# Patient Record
Sex: MALE | Race: WHITE | Employment: OTHER | URBAN - METROPOLITAN AREA
[De-identification: names, ages, dates, MRNs, and addresses within clinical notes are randomized per-mention and may not be internally consistent; named-entity substitution may affect disease eponyms.]

---

## 2017-09-06 ENCOUNTER — TELEPHONE (OUTPATIENT)
Dept: FAMILY MEDICINE CLINIC | Age: 82
End: 2017-09-06

## 2017-09-06 NOTE — TELEPHONE ENCOUNTER
Willian Pereira with [facility] is calling to get orders from Dr. Dutch Pritchard for the patient to complete PT with them. The patient called them with concerns about falls and balance and thought PT would be helpful. Please call Willian Pereira to give her those orders. She is aware that Dr. Dutch Pritchard is out today.

## 2017-12-06 ENCOUNTER — TELEPHONE (OUTPATIENT)
Dept: FAMILY MEDICINE CLINIC | Age: 82
End: 2017-12-06

## 2017-12-06 NOTE — TELEPHONE ENCOUNTER
MAI  --  Patient called this morning upset because someone called him and left a message indicating he needed to make an appointment since he had not been to office in a year. I did not see an encounter. He said he was no longer seeing Dr. Lori Mcgraw and seeing a different PCP. He called back this afternoon upset because the message was still on his phone. I advised he needed to delete it from phone. He told me I needed to delete it and he was hanging up (yelling). SHARYNO call?

## 2018-12-10 ENCOUNTER — APPOINTMENT (OUTPATIENT)
Dept: CT IMAGING | Age: 83
End: 2018-12-10
Payer: MEDICARE

## 2018-12-10 ENCOUNTER — APPOINTMENT (OUTPATIENT)
Dept: GENERAL RADIOLOGY | Age: 83
End: 2018-12-10
Payer: MEDICARE

## 2018-12-10 ENCOUNTER — HOSPITAL ENCOUNTER (EMERGENCY)
Age: 83
Discharge: HOME | End: 2018-12-10
Attending: EMERGENCY MEDICINE
Payer: MEDICARE

## 2018-12-10 VITALS
HEIGHT: 70 IN | TEMPERATURE: 97.8 F | WEIGHT: 170 LBS | RESPIRATION RATE: 20 BRPM | HEART RATE: 82 BPM | SYSTOLIC BLOOD PRESSURE: 148 MMHG | DIASTOLIC BLOOD PRESSURE: 59 MMHG | BODY MASS INDEX: 24.34 KG/M2 | OXYGEN SATURATION: 97 %

## 2018-12-10 DIAGNOSIS — S22.31XA CLOSED FRACTURE OF ONE RIB OF RIGHT SIDE, INITIAL ENCOUNTER: ICD-10-CM
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
## 2018-12-10 PROCEDURE — 72125 CT NECK SPINE W/O DYE: CPT

## 2018-12-10 PROCEDURE — 99285 EMERGENCY DEPT VISIT HI MDM: CPT

## 2018-12-10 PROCEDURE — 73030 X-RAY EXAM OF SHOULDER: CPT

## 2018-12-10 PROCEDURE — 6370000000 HC RX 637 (ALT 250 FOR IP): Performed by: EMERGENCY MEDICINE

## 2018-12-10 PROCEDURE — 70450 CT HEAD/BRAIN W/O DYE: CPT

## 2018-12-10 PROCEDURE — 71101 X-RAY EXAM UNILAT RIBS/CHEST: CPT

## 2018-12-10 RX ORDER — OXYCODONE HYDROCHLORIDE AND ACETAMINOPHEN 5; 325 MG/1; MG/1
1 TABLET ORAL EVERY 8 HOURS PRN
Qty: 6 TABLET | Refills: 0 | Status: SHIPPED | OUTPATIENT
Start: 2018-12-10 | End: 2018-12-17

## 2018-12-10 RX ORDER — IBUPROFEN 600 MG/1
600 TABLET ORAL ONCE
Status: COMPLETED | OUTPATIENT
Start: 2018-12-10 | End: 2018-12-10

## 2018-12-10 RX ADMIN — IBUPROFEN 600 MG: 600 TABLET ORAL at 04:35

## 2018-12-10 ASSESSMENT — PAIN DESCRIPTION - ORIENTATION: ORIENTATION: RIGHT

## 2018-12-10 ASSESSMENT — PAIN DESCRIPTION - PAIN TYPE: TYPE: ACUTE PAIN

## 2018-12-10 ASSESSMENT — ENCOUNTER SYMPTOMS
BACK PAIN: 0
NAUSEA: 0
SHORTNESS OF BREATH: 0
COUGH: 0
VOMITING: 0

## 2018-12-10 ASSESSMENT — PAIN SCALES - GENERAL
PAINLEVEL_OUTOF10: 3
PAINLEVEL_OUTOF10: 1

## 2018-12-10 ASSESSMENT — PAIN DESCRIPTION - LOCATION: LOCATION: RIB CAGE

## 2018-12-10 NOTE — ED PROVIDER NOTES
IMPLANT Left 04/28/2016         CURRENT MEDICATIONS       Discharge Medication List as of 12/10/2018  5:45 AM      CONTINUE these medications which have NOT CHANGED    Details   acetaminophen (AMINOFEN) 325 MG tablet Take 2 tablets by mouth every 6 hours as needed for Pain, Disp-24 tablet, R-0Print      naproxen (NAPROSYN) 500 MG tablet Take 1 tablet by mouth 2 times daily for 5 days, Disp-10 tablet, R-0      lisinopril (PRINIVIL;ZESTRIL) 2.5 MG tablet Take 2.5 mg by mouth daily       sertraline (ZOLOFT) 50 MG tablet Take 50 mg by mouth daily      simvastatin (ZOCOR) 40 MG tablet Take 40 mg by mouth nightly      triamcinolone (KENALOG) 0.1 % ointment Apply topically 2 times daily Apply topically 2 times daily. , Topical, Historical Med      clonazePAM (KLONOPIN) 0.5 MG tablet Take 0.5 mg by mouth 2 times daily as needed      carbidopa-levodopa (SINEMET)  MG per tablet Take 3 tablets by mouth 4 times daily       donepezil (ARICEPT) 5 MG tablet   Take 10 mg by mouth nightly       metformin (GLUCOPHAGE) 500 MG tablet Take 500 mg by mouth 2 times daily (with meals). Aspirin Buf,SuHak-PbChp-FbRah, (BUFFERED ASPIRIN) 325 MG TABS Take  by mouth. omeprazole (PRILOSEC) 20 MG capsule Take 20 mg by mouth daily. ALLERGIES     Patient has no known allergies.     FAMILY HISTORY       Family History   Problem Relation Age of Onset    Kidney Disease Mother     Diabetes Father     Diabetes Sister           SOCIAL HISTORY       Social History     Social History    Marital status:      Spouse name: N/A    Number of children: N/A    Years of education: N/A     Social History Main Topics    Smoking status: Former Smoker     Quit date: 1/1/1966    Smokeless tobacco: Never Used    Alcohol use No    Drug use: No    Sexual activity: Not Asked     Other Topics Concern    None     Social History Narrative    None       SCREENINGS             PHYSICAL EXAM    (up to 7 for level 4, 8 ormore for level 5)     ED Triage Vitals   BP Temp Temp Source Pulse Resp SpO2 Height Weight   12/10/18 0312 12/10/18 0312 12/10/18 0312 12/10/18 0312 12/10/18 0312 12/10/18 0312 12/10/18 0314 12/10/18 0314   (!) 148/98 97.8 °F (36.6 °C) Oral 62 18 100 % 5' 10\" (1.778 m) 170 lb (77.1 kg)       Physical Exam   Constitutional: He is oriented to person, place, and time. He appears well-developed and well-nourished. He is cooperative. Non-toxic appearance. He does not have a sickly appearance. He does not appear ill. No distress. Sitting in bed comfortably, speaking in full sentences, following verbal commands appropriately. Not in acute distress     HENT:   Head: Normocephalic and atraumatic. Right Ear: Tympanic membrane normal.   Left Ear: Tympanic membrane normal.   Mouth/Throat: Oropharynx is clear and moist.   Eyes: Pupils are equal, round, and reactive to light. Conjunctivae and EOM are normal.   Neck: Normal range of motion. Neck supple. Cardiovascular: Normal rate, regular rhythm, normal heart sounds and intact distal pulses. Exam reveals no gallop and no friction rub. No murmur heard. Pulmonary/Chest: Effort normal and breath sounds normal. No respiratory distress. He has no decreased breath sounds. He has no wheezes. He has no rhonchi. He has no rales. Abdominal: Soft. Normal appearance and bowel sounds are normal. He exhibits no distension. There is no tenderness. There is no rebound and no guarding. Musculoskeletal: Normal range of motion. He exhibits no edema, tenderness or deformity. Neurological: He is alert and oriented to person, place, and time. GCS eye subscore is 4. GCS verbal subscore is 5. GCS motor subscore is 6. Skin: Skin is warm and dry. No rash noted.        DIAGNOSTIC RESULTS     EKG: All EKG's are interpreted by the Emergency Department Physicianwho either signs or Co-signs this chart in the absence of a cardiologist.      RADIOLOGY:   Non-plain film images such as CT, Ultrasound

## 2018-12-10 NOTE — ED NOTES
Bed: 12  Expected date:   Expected time:   Means of arrival:   Comments:  Drexel Severe, RN  12/10/18 5779

## 2019-01-21 ENCOUNTER — TELEPHONE (OUTPATIENT)
Dept: FAMILY MEDICINE CLINIC | Age: 84
End: 2019-01-21

## 2019-03-14 ENCOUNTER — HOSPITAL ENCOUNTER (EMERGENCY)
Age: 84
Discharge: ANOTHER HOSPITAL | End: 2019-03-14
Attending: EMERGENCY MEDICINE
Payer: MEDICARE

## 2019-03-14 ENCOUNTER — APPOINTMENT (OUTPATIENT)
Dept: CT IMAGING | Age: 84
End: 2019-03-14
Payer: MEDICARE

## 2019-03-14 VITALS
HEART RATE: 57 BPM | WEIGHT: 160 LBS | BODY MASS INDEX: 23.7 KG/M2 | DIASTOLIC BLOOD PRESSURE: 53 MMHG | OXYGEN SATURATION: 99 % | RESPIRATION RATE: 17 BRPM | TEMPERATURE: 96.7 F | HEIGHT: 69 IN | SYSTOLIC BLOOD PRESSURE: 116 MMHG

## 2019-03-14 DIAGNOSIS — G20 PARKINSON'S DISEASE (HCC): ICD-10-CM

## 2019-03-14 DIAGNOSIS — R53.83 FATIGUE, UNSPECIFIED TYPE: ICD-10-CM

## 2019-03-14 DIAGNOSIS — S06.5XAA SUBDURAL HEMATOMA: Primary | ICD-10-CM

## 2019-03-14 LAB
A/G RATIO: 1.3 (ref 1.1–2.2)
ALBUMIN SERPL-MCNC: 3.5 G/DL (ref 3.4–5)
ALP BLD-CCNC: 115 U/L (ref 40–129)
ALT SERPL-CCNC: <5 U/L (ref 10–40)
ANION GAP SERPL CALCULATED.3IONS-SCNC: 11 MMOL/L (ref 3–16)
AST SERPL-CCNC: 10 U/L (ref 15–37)
BASOPHILS ABSOLUTE: 0.1 K/UL (ref 0–0.2)
BASOPHILS RELATIVE PERCENT: 0.8 %
BILIRUB SERPL-MCNC: <0.2 MG/DL (ref 0–1)
BUN BLDV-MCNC: 22 MG/DL (ref 7–20)
CALCIUM SERPL-MCNC: 9 MG/DL (ref 8.3–10.6)
CHLORIDE BLD-SCNC: 104 MMOL/L (ref 99–110)
CO2: 26 MMOL/L (ref 21–32)
CREAT SERPL-MCNC: 1.1 MG/DL (ref 0.8–1.3)
EOSINOPHILS ABSOLUTE: 0.2 K/UL (ref 0–0.6)
EOSINOPHILS RELATIVE PERCENT: 3.2 %
GFR AFRICAN AMERICAN: >60
GFR NON-AFRICAN AMERICAN: >60
GLOBULIN: 2.7 G/DL
GLUCOSE BLD-MCNC: 168 MG/DL (ref 70–99)
HCT VFR BLD CALC: 37.8 % (ref 40.5–52.5)
HEMOGLOBIN: 12.8 G/DL (ref 13.5–17.5)
LYMPHOCYTES ABSOLUTE: 1.2 K/UL (ref 1–5.1)
LYMPHOCYTES RELATIVE PERCENT: 18.2 %
MCH RBC QN AUTO: 29.6 PG (ref 26–34)
MCHC RBC AUTO-ENTMCNC: 33.8 G/DL (ref 31–36)
MCV RBC AUTO: 87.5 FL (ref 80–100)
MONOCYTES ABSOLUTE: 0.6 K/UL (ref 0–1.3)
MONOCYTES RELATIVE PERCENT: 8.3 %
NEUTROPHILS ABSOLUTE: 4.7 K/UL (ref 1.7–7.7)
NEUTROPHILS RELATIVE PERCENT: 69.5 %
PDW BLD-RTO: 17.1 % (ref 12.4–15.4)
PLATELET # BLD: 147 K/UL (ref 135–450)
PMV BLD AUTO: 8.2 FL (ref 5–10.5)
POTASSIUM SERPL-SCNC: 4.2 MMOL/L (ref 3.5–5.1)
RBC # BLD: 4.32 M/UL (ref 4.2–5.9)
SODIUM BLD-SCNC: 141 MMOL/L (ref 136–145)
TOTAL PROTEIN: 6.2 G/DL (ref 6.4–8.2)
TROPONIN: <0.01 NG/ML
WBC # BLD: 6.7 K/UL (ref 4–11)

## 2019-03-14 PROCEDURE — 84484 ASSAY OF TROPONIN QUANT: CPT

## 2019-03-14 PROCEDURE — 70450 CT HEAD/BRAIN W/O DYE: CPT

## 2019-03-14 PROCEDURE — 85025 COMPLETE CBC W/AUTO DIFF WBC: CPT

## 2019-03-14 PROCEDURE — 80053 COMPREHEN METABOLIC PANEL: CPT

## 2019-03-14 PROCEDURE — 99285 EMERGENCY DEPT VISIT HI MDM: CPT

## 2019-03-14 PROCEDURE — 93005 ELECTROCARDIOGRAM TRACING: CPT | Performed by: EMERGENCY MEDICINE

## 2019-03-14 RX ORDER — QUETIAPINE FUMARATE 50 MG/1
75 TABLET, FILM COATED ORAL NIGHTLY
COMMUNITY

## 2019-03-14 RX ORDER — ZOLPIDEM TARTRATE 10 MG/1
10 TABLET ORAL NIGHTLY PRN
COMMUNITY
End: 2020-01-14 | Stop reason: ALTCHOICE

## 2019-03-15 LAB
EKG ATRIAL RATE: 60 BPM
EKG DIAGNOSIS: NORMAL
EKG P AXIS: 69 DEGREES
EKG P-R INTERVAL: 158 MS
EKG Q-T INTERVAL: 442 MS
EKG QRS DURATION: 92 MS
EKG QTC CALCULATION (BAZETT): 442 MS
EKG R AXIS: 43 DEGREES
EKG T AXIS: 14 DEGREES
EKG VENTRICULAR RATE: 60 BPM

## 2019-03-15 PROCEDURE — 93010 ELECTROCARDIOGRAM REPORT: CPT | Performed by: INTERNAL MEDICINE

## 2020-01-14 ENCOUNTER — HOSPITAL ENCOUNTER (EMERGENCY)
Age: 85
Discharge: ANOTHER HOSPITAL | End: 2020-01-14
Attending: EMERGENCY MEDICINE
Payer: MEDICARE

## 2020-01-14 ENCOUNTER — HOSPITAL ENCOUNTER (OUTPATIENT)
Age: 85
Setting detail: OBSERVATION
Discharge: OTHER INSTITUTION | End: 2020-01-16
Attending: INTERNAL MEDICINE | Admitting: INTERNAL MEDICINE
Payer: MEDICARE

## 2020-01-14 ENCOUNTER — APPOINTMENT (OUTPATIENT)
Dept: CT IMAGING | Age: 85
End: 2020-01-14
Payer: MEDICARE

## 2020-01-14 ENCOUNTER — APPOINTMENT (OUTPATIENT)
Dept: GENERAL RADIOLOGY | Age: 85
End: 2020-01-14
Payer: MEDICARE

## 2020-01-14 VITALS
DIASTOLIC BLOOD PRESSURE: 75 MMHG | HEART RATE: 98 BPM | HEIGHT: 68 IN | SYSTOLIC BLOOD PRESSURE: 110 MMHG | RESPIRATION RATE: 21 BRPM | TEMPERATURE: 97.4 F | BODY MASS INDEX: 22.73 KG/M2 | WEIGHT: 150 LBS | OXYGEN SATURATION: 100 %

## 2020-01-14 PROBLEM — I48.91 NEW ONSET A-FIB (HCC): Status: ACTIVE | Noted: 2020-01-14

## 2020-01-14 LAB
A/G RATIO: 1.2 (ref 1.1–2.2)
ALBUMIN SERPL-MCNC: 3.6 G/DL (ref 3.4–5)
ALP BLD-CCNC: 144 U/L (ref 40–129)
ALT SERPL-CCNC: <5 U/L (ref 10–40)
ANION GAP SERPL CALCULATED.3IONS-SCNC: 12 MMOL/L (ref 3–16)
AST SERPL-CCNC: 18 U/L (ref 15–37)
BACTERIA: ABNORMAL /HPF
BASOPHILS ABSOLUTE: 0.1 K/UL (ref 0–0.2)
BASOPHILS RELATIVE PERCENT: 0.6 %
BILIRUB SERPL-MCNC: <0.2 MG/DL (ref 0–1)
BILIRUBIN URINE: NEGATIVE
BLOOD, URINE: ABNORMAL
BUN BLDV-MCNC: 22 MG/DL (ref 7–20)
CALCIUM SERPL-MCNC: 9.1 MG/DL (ref 8.3–10.6)
CHLORIDE BLD-SCNC: 105 MMOL/L (ref 99–110)
CLARITY: CLEAR
CO2: 25 MMOL/L (ref 21–32)
COLOR: YELLOW
CREAT SERPL-MCNC: 1 MG/DL (ref 0.8–1.3)
EKG ATRIAL RATE: 86 BPM
EKG DIAGNOSIS: NORMAL
EKG Q-T INTERVAL: 408 MS
EKG QRS DURATION: 96 MS
EKG QTC CALCULATION (BAZETT): 452 MS
EKG R AXIS: -41 DEGREES
EKG T AXIS: 60 DEGREES
EKG VENTRICULAR RATE: 74 BPM
EOSINOPHILS ABSOLUTE: 0.3 K/UL (ref 0–0.6)
EOSINOPHILS RELATIVE PERCENT: 3 %
EPITHELIAL CELLS, UA: ABNORMAL /HPF
GFR AFRICAN AMERICAN: >60
GFR NON-AFRICAN AMERICAN: >60
GLOBULIN: 3.1 G/DL
GLUCOSE BLD-MCNC: 129 MG/DL (ref 70–99)
GLUCOSE URINE: 100 MG/DL
HCT VFR BLD CALC: 43.5 % (ref 40.5–52.5)
HEMOGLOBIN: 14.5 G/DL (ref 13.5–17.5)
KETONES, URINE: NEGATIVE MG/DL
LACTIC ACID, SEPSIS: 1.6 MMOL/L (ref 0.4–1.9)
LEUKOCYTE ESTERASE, URINE: ABNORMAL
LYMPHOCYTES ABSOLUTE: 1.2 K/UL (ref 1–5.1)
LYMPHOCYTES RELATIVE PERCENT: 12.2 %
MCH RBC QN AUTO: 30.9 PG (ref 26–34)
MCHC RBC AUTO-ENTMCNC: 33.4 G/DL (ref 31–36)
MCV RBC AUTO: 92.5 FL (ref 80–100)
MICROSCOPIC EXAMINATION: YES
MONOCYTES ABSOLUTE: 0.8 K/UL (ref 0–1.3)
MONOCYTES RELATIVE PERCENT: 7.7 %
NEUTROPHILS ABSOLUTE: 7.5 K/UL (ref 1.7–7.7)
NEUTROPHILS RELATIVE PERCENT: 76.5 %
NITRITE, URINE: NEGATIVE
PDW BLD-RTO: 13.9 % (ref 12.4–15.4)
PH UA: 6 (ref 5–8)
PLATELET # BLD: 178 K/UL (ref 135–450)
PMV BLD AUTO: 9.7 FL (ref 5–10.5)
POTASSIUM REFLEX MAGNESIUM: 4.4 MMOL/L (ref 3.5–5.1)
PROTEIN UA: ABNORMAL MG/DL
RBC # BLD: 4.7 M/UL (ref 4.2–5.9)
RBC UA: ABNORMAL /HPF (ref 0–2)
SODIUM BLD-SCNC: 142 MMOL/L (ref 136–145)
SPECIFIC GRAVITY UA: 1.02 (ref 1–1.03)
TOTAL PROTEIN: 6.7 G/DL (ref 6.4–8.2)
TROPONIN: <0.01 NG/ML
TSH REFLEX: 3.47 UIU/ML (ref 0.27–4.2)
URINE REFLEX TO CULTURE: YES
URINE TYPE: ABNORMAL
UROBILINOGEN, URINE: 0.2 E.U./DL
WBC # BLD: 9.8 K/UL (ref 4–11)
WBC UA: ABNORMAL /HPF (ref 0–5)

## 2020-01-14 PROCEDURE — 70498 CT ANGIOGRAPHY NECK: CPT

## 2020-01-14 PROCEDURE — 85025 COMPLETE CBC W/AUTO DIFF WBC: CPT

## 2020-01-14 PROCEDURE — 87086 URINE CULTURE/COLONY COUNT: CPT

## 2020-01-14 PROCEDURE — 6360000002 HC RX W HCPCS: Performed by: EMERGENCY MEDICINE

## 2020-01-14 PROCEDURE — 84484 ASSAY OF TROPONIN QUANT: CPT

## 2020-01-14 PROCEDURE — 93005 ELECTROCARDIOGRAM TRACING: CPT | Performed by: EMERGENCY MEDICINE

## 2020-01-14 PROCEDURE — 36415 COLL VENOUS BLD VENIPUNCTURE: CPT

## 2020-01-14 PROCEDURE — 71045 X-RAY EXAM CHEST 1 VIEW: CPT

## 2020-01-14 PROCEDURE — 87040 BLOOD CULTURE FOR BACTERIA: CPT

## 2020-01-14 PROCEDURE — 94761 N-INVAS EAR/PLS OXIMETRY MLT: CPT

## 2020-01-14 PROCEDURE — G0379 DIRECT REFER HOSPITAL OBSERV: HCPCS

## 2020-01-14 PROCEDURE — G0378 HOSPITAL OBSERVATION PER HR: HCPCS

## 2020-01-14 PROCEDURE — 80053 COMPREHEN METABOLIC PANEL: CPT

## 2020-01-14 PROCEDURE — 87077 CULTURE AEROBIC IDENTIFY: CPT

## 2020-01-14 PROCEDURE — 70450 CT HEAD/BRAIN W/O DYE: CPT

## 2020-01-14 PROCEDURE — 81001 URINALYSIS AUTO W/SCOPE: CPT

## 2020-01-14 PROCEDURE — 84443 ASSAY THYROID STIM HORMONE: CPT

## 2020-01-14 PROCEDURE — 6360000004 HC RX CONTRAST MEDICATION: Performed by: EMERGENCY MEDICINE

## 2020-01-14 PROCEDURE — 2580000003 HC RX 258: Performed by: EMERGENCY MEDICINE

## 2020-01-14 PROCEDURE — 2700000000 HC OXYGEN THERAPY PER DAY

## 2020-01-14 PROCEDURE — 83605 ASSAY OF LACTIC ACID: CPT

## 2020-01-14 PROCEDURE — 6370000000 HC RX 637 (ALT 250 FOR IP): Performed by: EMERGENCY MEDICINE

## 2020-01-14 PROCEDURE — 93010 ELECTROCARDIOGRAM REPORT: CPT | Performed by: INTERNAL MEDICINE

## 2020-01-14 PROCEDURE — 99285 EMERGENCY DEPT VISIT HI MDM: CPT

## 2020-01-14 PROCEDURE — 87186 SC STD MICRODIL/AGAR DIL: CPT

## 2020-01-14 RX ORDER — CLOBETASOL PROPIONATE 0.05 G/ML
SPRAY TOPICAL
COMMUNITY

## 2020-01-14 RX ORDER — 0.9 % SODIUM CHLORIDE 0.9 %
500 INTRAVENOUS SOLUTION INTRAVENOUS ONCE
Status: COMPLETED | OUTPATIENT
Start: 2020-01-14 | End: 2020-01-14

## 2020-01-14 RX ADMIN — SODIUM CHLORIDE 500 ML: 9 INJECTION, SOLUTION INTRAVENOUS at 16:34

## 2020-01-14 RX ADMIN — IOPAMIDOL 85 ML: 755 INJECTION, SOLUTION INTRAVENOUS at 16:12

## 2020-01-14 RX ADMIN — CARBIDOPA AND LEVODOPA 1 TABLET: 25; 100 TABLET ORAL at 17:37

## 2020-01-14 RX ADMIN — CEFEPIME 2 G: 2 INJECTION, POWDER, FOR SOLUTION INTRAVENOUS at 16:31

## 2020-01-14 ASSESSMENT — PAIN SCALES - GENERAL: PAINLEVEL_OUTOF10: 5

## 2020-01-14 NOTE — ED NOTES
Sent Perfect serve to Dr. Amber Myers to be sent on over to [facility]. Who Dr. Maya Perkins talked to about this pt. When down here in ER.       Malia Ortiz  01/14/20 0743

## 2020-01-14 NOTE — ED NOTES
1740 - ([address]) @ [address] [facility]. Called for [facility] at [facility]. Dx: General Weakness/Hypothermia     Eric Cabral  01/14/20 1745    Transport Needs:  Medic unit  IV - Hep Lock  o2 - 2 Lpm  + Monitor      Eric Cabral  01/14/20 1756    1821 - Dr. Anamaria Myers called back via [address] [facility]. Eric Cabarl  01/14/20 1822    1846 - called [facility] to ask question went ahead and had Mikala QUEZADA take down transport needs for this pt. Eric Cabral  01/14/20 1847    1858 - [facility] called with admit info.   Room # 598  Report # [id number]  [facility] ETA 1 Hour     Eric Cabral  01/14/20 1900

## 2020-01-14 NOTE — ED PROVIDER NOTES
[address] ED    CHIEF COMPLAINT  Facial Droop (left sided facial droop  pt came from [place] where last noticed normal 1230   came by EMS)       HISTORY OF PRESENT ILLNESS  Jovita Herron is a 80 y.o. male with past medical history including DM 2, hypertension, hyperlipidemia, Parkinson's disease, and as below who presents to the ED via EMS from nursing facility with left facial droop and dysarthria. The patient apparently went to use the restroom at the nursing facility and was found to be staring off in space thereafter. Was difficult to arouse. Was noted to have left facial droop and dysarthria. Temp 92.7 F prior to transport. . Last seen well at 1230. Symptoms noted at 1245. BP 96/68 en route. Daughter arrives later in the emergency department course and states that facial droop is normal for the patient. States that weakness of the extremities is new for the patient. Chart review reveals history of subdural hematoma within the last year. Patient himself is without complaint. No other complaints, modifying factors or associated symptoms.      I have reviewed the following from the nursing documentation:    Past Medical History:   Diagnosis Date    GERD (gastroesophageal reflux disease)     Hyperlipidemia     Hypertension     Parkinson disease ([facility] [address]     Type II or unspecified type diabetes mellitus without mention of complication, not stated as uncontrolled     Ulcer      Past Surgical History:   Procedure Laterality Date    CATARACT REMOVAL WITH IMPLANT      CATARACT REMOVAL WITH IMPLANT Left 04/28/2016     Family History   Problem Relation Age of Onset    Kidney Disease Mother     Diabetes Father     Diabetes Sister      Social History     Socioeconomic History    Marital status:      Spouse name: Not on file    Number of children: Not on file    Years of education: Not on file    Highest education level: Not on file   Occupational History    Not on file MCV 92.5 80.0 - 100.0 fL    MCH 30.9 26.0 - 34.0 pg    MCHC 33.4 31.0 - 36.0 g/dL    RDW 13.9 12.4 - 15.4 %    Platelets 605 941 - 641 K/uL    MPV 9.7 5.0 - 10.5 fL    Neutrophils % 76.5 %    Lymphocytes % 12.2 %    Monocytes % 7.7 %    Eosinophils % 3.0 %    Basophils % 0.6 %    Neutrophils Absolute 7.5 1.7 - 7.7 K/uL    Lymphocytes Absolute 1.2 1.0 - 5.1 K/uL    Monocytes Absolute 0.8 0.0 - 1.3 K/uL    Eosinophils Absolute 0.3 0.0 - 0.6 K/uL    Basophils Absolute 0.1 0.0 - 0.2 K/uL   Urinalysis Reflex to Culture   Result Value Ref Range    Color, UA Yellow Straw/Yellow    Clarity, UA Clear Clear    Glucose, Ur 100 (A) Negative mg/dL    Bilirubin Urine Negative Negative    Ketones, Urine Negative Negative mg/dL    Specific Gravity, UA 1.020 1.005 - 1.030    Blood, Urine SMALL (A) Negative    pH, UA 6.0 5.0 - 8.0    Protein, UA TRACE (A) Negative mg/dL    Urobilinogen, Urine 0.2 <2.0 E.U./dL    Nitrite, Urine Negative Negative    Leukocyte Esterase, Urine SMALL (A) Negative    Microscopic Examination YES     Urine Type NotGiven     Urine Reflex to Culture Yes    Lactate, Sepsis   Result Value Ref Range    Lactic Acid, Sepsis 1.6 0.4 - 1.9 mmol/L   Microscopic Urinalysis   Result Value Ref Range    WBC, UA 10-20 (A) 0 - 5 /HPF    RBC, UA 10-20 (A) 0 - 2 /HPF    Epi Cells 0-2 /HPF    Bacteria, UA 4+ (A) /HPF       RADIOLOGY  I have reviewed all radiographic studies for this visit. XR CHEST PORTABLE   Final Result   No acute pulmonary disease. CT Head WO Contrast    (Results Pending)   CTA HEAD NECK W CONTRAST    (Results Pending)        ECG  EKG interpreted by myself.    Rate: normal  Rhythm: Atrial fibrillation  Axis: -41  Intervals: QRS 96 QTc 452  ST Segments: no acute abnormality  T waves: no acute abnormality  Comparison: Compared to 3/14/19, the rhythm is atrial fibrillation from normal sinus rhythm   Impression: Atrial fibrillation with left axis deviation, possible septal infarct seen on or before 3/14/2019       ED COURSE/MDM  Patient seen and evaluated. Old records reviewed. Labs and imaging reviewed and results discussed with patient. 80 y.o. male presented with extremity weakness and is found to have hypothermia, urinary tract infection. On arrival, the patient is hypothermic with temperature 93.9 °F.  Vital signs otherwise reassuring. GCS 15. Initially, the history of dysarthria and facial droop raise concern for acute ischemic stroke. The patient is in a time window for TPA administration, his previous history of intracranial hemorrhage is contraindication. Moreover, the patient's bilateral extremity weakness seems less consistent with acute ischemic stroke, and the patient's hypothermia and infection are more likely the etiology of his weakness. EKG reveals atrial fibrillation without acute ischemic change. CBC reveals no leukocytosis or significant anemia. Urinalysis is concerning for urinary tract infection. Cefepime administered. Lactate within normal limits, will defer 30 cc/kg IV fluid bolus. At this time I am going off service and have signed out care of the patient to my colleague, Dr. Scott Martinez. At the time of signout, the following is pending:    - f/u outstanding labs   - reassess/disposition (anticipate admit to hospital medicine)    All questions were answered and the patient/family expressed understanding and agreement with the plan. During the patient's ED course, the patient was given:  Medications   cefepime (MAXIPIME) 2 g IVPB minibag (has no administration in time range)   0.9 % sodium chloride bolus (has no administration in time range)        CLINICAL IMPRESSION  1. Septicemia ([address]    2. Acute cystitis with hematuria    3. Hypothermia, initial encounter        DISPOSITION   Admit    Condition: stable    Zuleyma Herrera MD    Note: This chart was created using voice recognition dictation software.  Efforts were made by me to ensure accuracy, however some errors may be present due to limitations of this technology and occasionally words are not transcribed correctly.        Zohreh Vargas MD  01/14/20

## 2020-01-15 ENCOUNTER — APPOINTMENT (OUTPATIENT)
Dept: MRI IMAGING | Age: 85
End: 2020-01-15
Attending: INTERNAL MEDICINE
Payer: MEDICARE

## 2020-01-15 PROBLEM — I63.232 ARTERIAL ISCHEMIC STROKE, ICA, LEFT, ACUTE (HCC): Status: ACTIVE | Noted: 2020-01-15

## 2020-01-15 PROBLEM — R29.90 STROKE-LIKE EPISODE: Status: ACTIVE | Noted: 2020-01-15

## 2020-01-15 PROBLEM — I48.0 PAF (PAROXYSMAL ATRIAL FIBRILLATION) (HCC): Status: ACTIVE | Noted: 2020-01-14

## 2020-01-15 LAB
ESTIMATED AVERAGE GLUCOSE: 182.9 MG/DL
GLUCOSE BLD-MCNC: 111 MG/DL (ref 70–99)
GLUCOSE BLD-MCNC: 141 MG/DL (ref 70–99)
GLUCOSE BLD-MCNC: 151 MG/DL (ref 70–99)
GLUCOSE BLD-MCNC: 162 MG/DL (ref 70–99)
GLUCOSE BLD-MCNC: 260 MG/DL (ref 70–99)
HBA1C MFR BLD: 8 %
LV EF: 58 %
LVEF MODALITY: NORMAL
PERFORMED ON: ABNORMAL

## 2020-01-15 PROCEDURE — 2700000000 HC OXYGEN THERAPY PER DAY

## 2020-01-15 PROCEDURE — 94761 N-INVAS EAR/PLS OXIMETRY MLT: CPT

## 2020-01-15 PROCEDURE — 6370000000 HC RX 637 (ALT 250 FOR IP): Performed by: NURSE PRACTITIONER

## 2020-01-15 PROCEDURE — 2580000003 HC RX 258: Performed by: INTERNAL MEDICINE

## 2020-01-15 PROCEDURE — G0378 HOSPITAL OBSERVATION PER HR: HCPCS

## 2020-01-15 PROCEDURE — 99205 OFFICE O/P NEW HI 60 MIN: CPT | Performed by: INTERNAL MEDICINE

## 2020-01-15 PROCEDURE — 83036 HEMOGLOBIN GLYCOSYLATED A1C: CPT

## 2020-01-15 PROCEDURE — C8929 TTE W OR WO FOL WCON,DOPPLER: HCPCS

## 2020-01-15 PROCEDURE — 6370000000 HC RX 637 (ALT 250 FOR IP): Performed by: REGISTERED NURSE

## 2020-01-15 PROCEDURE — 92610 EVALUATE SWALLOWING FUNCTION: CPT

## 2020-01-15 PROCEDURE — 99220 PR INITIAL OBSERVATION CARE/DAY 70 MINUTES: CPT | Performed by: PSYCHIATRY & NEUROLOGY

## 2020-01-15 PROCEDURE — 92526 ORAL FUNCTION THERAPY: CPT

## 2020-01-15 PROCEDURE — 70551 MRI BRAIN STEM W/O DYE: CPT

## 2020-01-15 PROCEDURE — 6370000000 HC RX 637 (ALT 250 FOR IP): Performed by: INTERNAL MEDICINE

## 2020-01-15 PROCEDURE — 6360000002 HC RX W HCPCS: Performed by: INTERNAL MEDICINE

## 2020-01-15 PROCEDURE — 96372 THER/PROPH/DIAG INJ SC/IM: CPT

## 2020-01-15 RX ORDER — DEXTROSE MONOHYDRATE 25 G/50ML
12.5 INJECTION, SOLUTION INTRAVENOUS PRN
Status: DISCONTINUED | OUTPATIENT
Start: 2020-01-15 | End: 2020-01-16 | Stop reason: HOSPADM

## 2020-01-15 RX ORDER — SODIUM CHLORIDE 0.9 % (FLUSH) 0.9 %
10 SYRINGE (ML) INJECTION EVERY 12 HOURS SCHEDULED
Status: DISCONTINUED | OUTPATIENT
Start: 2020-01-15 | End: 2020-01-16 | Stop reason: HOSPADM

## 2020-01-15 RX ORDER — ONDANSETRON 2 MG/ML
4 INJECTION INTRAMUSCULAR; INTRAVENOUS EVERY 6 HOURS PRN
Status: DISCONTINUED | OUTPATIENT
Start: 2020-01-15 | End: 2020-01-16 | Stop reason: HOSPADM

## 2020-01-15 RX ORDER — SODIUM CHLORIDE 0.9 % (FLUSH) 0.9 %
10 SYRINGE (ML) INJECTION PRN
Status: DISCONTINUED | OUTPATIENT
Start: 2020-01-15 | End: 2020-01-16 | Stop reason: HOSPADM

## 2020-01-15 RX ORDER — ACETAMINOPHEN 325 MG/1
650 TABLET ORAL EVERY 4 HOURS PRN
Status: DISCONTINUED | OUTPATIENT
Start: 2020-01-15 | End: 2020-01-16 | Stop reason: HOSPADM

## 2020-01-15 RX ORDER — VITAMIN B COMPLEX
1000 TABLET ORAL DAILY
Status: DISCONTINUED | OUTPATIENT
Start: 2020-01-15 | End: 2020-01-16 | Stop reason: HOSPADM

## 2020-01-15 RX ORDER — DONEPEZIL HYDROCHLORIDE 5 MG/1
10 TABLET, FILM COATED ORAL NIGHTLY
Status: DISCONTINUED | OUTPATIENT
Start: 2020-01-15 | End: 2020-01-16 | Stop reason: HOSPADM

## 2020-01-15 RX ORDER — SERTRALINE HYDROCHLORIDE 100 MG/1
100 TABLET, FILM COATED ORAL DAILY
COMMUNITY

## 2020-01-15 RX ORDER — AMOXICILLIN AND CLAVULANATE POTASSIUM 875; 125 MG/1; MG/1
1 TABLET, FILM COATED ORAL EVERY 12 HOURS SCHEDULED
Status: DISCONTINUED | OUTPATIENT
Start: 2020-01-15 | End: 2020-01-16 | Stop reason: HOSPADM

## 2020-01-15 RX ORDER — DEXTROSE MONOHYDRATE 50 MG/ML
100 INJECTION, SOLUTION INTRAVENOUS PRN
Status: DISCONTINUED | OUTPATIENT
Start: 2020-01-15 | End: 2020-01-16 | Stop reason: HOSPADM

## 2020-01-15 RX ORDER — NICOTINE POLACRILEX 4 MG
15 LOZENGE BUCCAL PRN
Status: DISCONTINUED | OUTPATIENT
Start: 2020-01-15 | End: 2020-01-16 | Stop reason: HOSPADM

## 2020-01-15 RX ORDER — QUETIAPINE FUMARATE 25 MG/1
75 TABLET, FILM COATED ORAL NIGHTLY
Status: DISCONTINUED | OUTPATIENT
Start: 2020-01-15 | End: 2020-01-16 | Stop reason: HOSPADM

## 2020-01-15 RX ORDER — PANTOPRAZOLE SODIUM 40 MG/1
40 TABLET, DELAYED RELEASE ORAL
Status: DISCONTINUED | OUTPATIENT
Start: 2020-01-15 | End: 2020-01-16 | Stop reason: HOSPADM

## 2020-01-15 RX ADMIN — CARBIDOPA AND LEVODOPA 1.5 TABLET: 25; 100 TABLET ORAL at 12:38

## 2020-01-15 RX ADMIN — CARBIDOPA AND LEVODOPA 1.5 TABLET: 25; 100 TABLET ORAL at 17:08

## 2020-01-15 RX ADMIN — QUETIAPINE FUMARATE 75 MG: 25 TABLET ORAL at 21:49

## 2020-01-15 RX ADMIN — INSULIN LISPRO 1 UNITS: 100 INJECTION, SOLUTION INTRAVENOUS; SUBCUTANEOUS at 12:39

## 2020-01-15 RX ADMIN — ACETAMINOPHEN 650 MG: 325 TABLET ORAL at 12:42

## 2020-01-15 RX ADMIN — AMOXICILLIN AND CLAVULANATE POTASSIUM 1 TABLET: 875; 125 TABLET, FILM COATED ORAL at 21:49

## 2020-01-15 RX ADMIN — INSULIN LISPRO 1 UNITS: 100 INJECTION, SOLUTION INTRAVENOUS; SUBCUTANEOUS at 17:09

## 2020-01-15 RX ADMIN — CARBIDOPA AND LEVODOPA 1.5 TABLET: 25; 100 TABLET ORAL at 08:39

## 2020-01-15 RX ADMIN — ENOXAPARIN SODIUM 40 MG: 40 INJECTION SUBCUTANEOUS at 08:39

## 2020-01-15 RX ADMIN — SERTRALINE HYDROCHLORIDE 100 MG: 50 TABLET ORAL at 08:38

## 2020-01-15 RX ADMIN — Medication 10 ML: at 08:40

## 2020-01-15 RX ADMIN — DONEPEZIL HYDROCHLORIDE 10 MG: 5 TABLET, FILM COATED ORAL at 21:49

## 2020-01-15 RX ADMIN — INSULIN LISPRO 2 UNITS: 100 INJECTION, SOLUTION INTRAVENOUS; SUBCUTANEOUS at 21:49

## 2020-01-15 RX ADMIN — Medication 10 ML: at 21:48

## 2020-01-15 RX ADMIN — VITAMIN D, TAB 1000IU (100/BT) 1000 UNITS: 25 TAB at 08:38

## 2020-01-15 RX ADMIN — CARBIDOPA AND LEVODOPA 1.5 TABLET: 25; 100 TABLET ORAL at 21:48

## 2020-01-15 RX ADMIN — PANTOPRAZOLE SODIUM 40 MG: 40 TABLET, DELAYED RELEASE ORAL at 06:55

## 2020-01-15 ASSESSMENT — PAIN SCALES - GENERAL
PAINLEVEL_OUTOF10: 2
PAINLEVEL_OUTOF10: 0
PAINLEVEL_OUTOF10: 6

## 2020-01-15 NOTE — PROGRESS NOTES
Secure message sent to cross cover, \"MAI direct admit arrived from [place]. Can we get orders placed? Thank you. mary ONEILL has DNR-CC paperwork in his chart. \"

## 2020-01-15 NOTE — PROGRESS NOTES
Patients ECHO, MRI and Neuro note are in. Let me know what your thoughts on discharge for this evening so I can update daughter.  Thanks

## 2020-01-15 NOTE — PLAN OF CARE
Problem: Falls - Risk of:  Goal: Will remain free from falls  Description  Will remain free from falls  Outcome: Ongoing     Problem: Risk for Impaired Skin Integrity  Goal: Tissue integrity - skin and mucous membranes  Description  Structural intactness and normal physiological function of skin and  mucous membranes. Outcome: Ongoing     Problem: Serum Glucose Level - Abnormal:  Goal: Ability to maintain appropriate glucose levels will improve  Description  Ability to maintain appropriate glucose levels will improve  Outcome: Ongoing   Pt educated on the importance of a carb control diet. Monitoring pt's blood glucose AC/HS. Sliding scale insulin given as ordered according to pt's blood glucose. Will continue to monitor.

## 2020-01-15 NOTE — ED PROVIDER NOTES
1:12 AM: I discussed the history, physical examination, laboratory and imaging studies, and treatment plan with Dr. Vinod Sebastian. Jovita Herron was signed out to me in stable condition. Please see Dr. Jeny Buck documentation for details of their history, physical, and laboratory studies. Upon re-examination, a summary of Adriana Bartlett's history, physical examination, and studies are as follows:      Patient was signed out to me pending head CT and lab studies. Patient's EKG is found to be atrial fibrillation which is new from prior. He was hypothermic here and the atrial fibrillation could be related to his hypothermia. However, he has not had this issue in the past.  At this point I will hold off on anticoagulation as he has a healing subdural hematoma. There are no ischemic changes seen on EKG. His troponin is negative. He is hypothermic with a normal lactate. This is concerning for infection and UA does appear infected with 4+ bacteria and pyuria. This sent for culture and he is started on cefepime. There is no ARELIS. He is mildly dehydrated and he is given fluids. CT head is negative for anything acute, just shows the improvement in the prior subdural.  CTA shows a potential aneurysm although I do not feel this is related to his symptoms today. The patient remained having a normal mental status here. TSH is within normal limits. I did speak with the hospitalist team here who prefers that the patient be transferred since he has new onset A. fib with prior intracranial hemorrhage, they feel he needs a neuro evaluation. They also suggested this but because he has generalized weakness. Again, I do not appreciate any focal deficits on examination however hospitalist team preferred that he be transferred.   The patient's daughter does state that the facial droop is chronic, and this is more so due to the fact that he had prior surgery on his left lower lip which makes him have the appearance of

## 2020-01-15 NOTE — PROGRESS NOTES
Physical Therapy/  Occupational Therapy  Orders received, chart reviewed, attempted PT/OT evaluations, pt being picked up for MRI on approach. Will attempt at later date/time [place] [id number]  Dee Galindo, OTR/L

## 2020-01-15 NOTE — PROGRESS NOTES
Speech Language Pathology  Facility/Department: [facility] A2 CARD TELEMETRY   CLINICAL BEDSIDE SWALLOW EVALUATION    NAME: Fer Almazan  : 1933  MRN: [id number]     SLP recommends continue a regular diet with thin liquids, meds whole with water or in bite of puree as tolerated, straws OK, aspiration precautions. SLP to follow 1-2x/week to monitor diet tolerance. ADMISSION DATE: 2020  ADMITTING DIAGNOSIS: has Type 2 diabetes mellitus without complication, without long-term current use of insulin ([facility] [address] Essential hypertension; Pure hypercholesterolemia; Spinal stenosis of lumbar region; Parkinsons disease ([facility] [address] Aortic stenosis; Memory loss; Right arm pain; Generalized edema; New onset a-fib [facility]); and Stroke-like episode ([facility] [address] on their problem list.  ONSET DATE: 20    Recent Chest Xray/CT of Chest: 20  Impression   No acute pulmonary disease. Date of Eval: 1/15/2020  Evaluating Therapist: Carola Shearer    Current Diet level:  Current Diet : Regular  Current Liquid Diet : Thin    Primary Complaint  Patient Complaint: None     Pain:  Pain Assessment  Pain Assessment: No report of pain     Reason for Referral  Fer Almazan was referred for a bedside swallow evaluation to assess the efficiency of his swallow function, identify signs and symptoms of aspiration and make recommendations regarding safe dietary consistencies, effective compensatory strategies, and safe eating environment. Impression  Dysphagia Diagnosis: Mild oral stage dysphagia;Mild pharyngeal stage dysphagia  Dysphagia Outcome Severity Scale: Level 6: Within functional limits/Modified independence     Dysphagia Impression : Pt seen this date for BSE. RN ok'd entry of SLP. Pt was awake and alert, partially reclined in bed. Pt's wife at bedside and initially resistant to SLP evaluation stating, \"He has Parkinson's. Who ordered this? He needs help with eating. \" Once SLP explained role and reasoning for

## 2020-01-15 NOTE — PROGRESS NOTES
Secure message sent to Sheila Campos NP, \"Pt direct admit from [place]. Hx of DM. Can we get SSI ordered? EKG at [place] showed new onset a-fib. Can we get tele ordered? Pt has DNR-CC paperwork in his chart. Can we change his code status? Thank you!!\"    Awaiting response.

## 2020-01-15 NOTE — PROGRESS NOTES
4 Eyes Skin Assessment     The patient is being assess for  Admission    I agree that 2 RN's have performed a thorough Head to Toe Skin Assessment on the patient. ALL assessment sites listed below have been assessed. Areas assessed by both nurses: Estela Conley and Whit RN  [x]   Head, Face, and Ears   [x]   Shoulders, Back, and Chest  [x]   Arms, Elbows, and Hands   [x]   Coccyx, Sacrum, and IschIum  [x]   Legs, Feet, and Heels     Abrasions, bruising scattered  Redness in groin folds  Redness bilat heels  Does the Patient have Skin Breakdown?   No         Logan Prevention initiated:  Yes   Wound Care Orders initiated:  No      [facility] nurse consulted for Pressure Injury (Stage 3,4, Unstageable, DTI, NWPT, and Complex wounds), New and Established Ostomies:  No      Nurse 1 eSignature: Electronically signed by Randy Rendon RN on 1/15/20 at 2:05 AM    **SHARE this note so that the co-signing nurse is able to place an eSignature**    Nurse 2 eSignature: Electronically signed by Garcia Reza RN on 1/15/20 at 3:54 PM

## 2020-01-15 NOTE — CARE COORDINATION
CASE MANAGEMENT INITIAL ASSESSMENT      Reviewed chart and met with patient today, re: assessed for DCP needs  Explained Case Management role/services Pt wife Soumya Holly. Family present: Pt wife Soumya Holly   Primary contact information: Pt wife Rockford Halsted date/status: observation  Diagnosis: new onset Afib  Is this a Readmission?:no    Insurance: [employer] Medicare  Precert required for SNF - Y        3 night stay required - N    Living arrangements, Adls, care needs, prior to admission: Pt lives in [address] [place] [address] at [facility] AUTHORITY: Transport back to facility no preference from family    [facility] at home: Walker__Cane__RTS__ BSC__Shower Chair__  02__ HHN__ CPAP__  BiPap__  Hospital Bed__ W/C___ Other__________    Services in the home and/or outpatient, prior to admission: none    Dialysis Facility (if applicable) none  · Name:  · Address:  · Dialysis Schedule:  · Phone:  · Fax:    PT/OT recs: none noted    Hospital Exemption Notification (HEN): if SNF is needed    Barriers to discharge: none    Plan/comments: Soumya Holly, Pt wife reported that she is waiting to get the Pt transferred back to LTC. She does not have a preference on transport. SW attempted to call [facility] home and left a VM message for Lay Levi will follow to arrange DCP.   FALLON Land      ECOC on chart for MD signature

## 2020-01-15 NOTE — CONSULTS
dextrose 50 % IV solution  12.5 g Intravenous PRN ENRIQUE Rose - CNP        glucagon (rDNA) injection 1 mg  1 mg Intramuscular PRN ENRIQUE Rose - CNP        dextrose 5 % solution  100 mL/hr Intravenous PRN ENRIQUE Rose - CNP        amoxicillin-clavulanate (AUGMENTIN) 875-125 MG per tablet 1 tablet  1 tablet Oral 2 times per day Kendal OlmosENRIQUE long CNP           ROS : A 10-14 system review was limited from the patient otherwise per H&P. Exam:     Constitutional:   Vitals:    01/15/20 0445 01/15/20 0744 01/15/20 1233 01/15/20 1602   BP: (!) 146/74 (!) 147/75 130/75 (!) 146/80   Pulse: 71 67 64 76   Resp: 18 16 16 16   Temp: 97.3 °F (36.3 °C) 97.6 °F (36.4 °C) 97.8 °F (36.6 °C) 97.8 °F (36.6 °C)   TempSrc: Oral Oral Oral Oral   SpO2: 100% 100% 99% 98%   Weight:       Height:           General appearance:  Normal development and appear in no acute distress. Eye: No icterus. Fundus: Cannot test funduscopic exam due to poor cooperation from the patient. Neck: supple  Cardiovascular: No carotid bruit. No lower leg edema with good pulsation. Mental Status:   AAO x2  1/3 immediate recall  Fluent language but with paraphasic errors  Poor immediate recall and remote memory  Poor fund of knowledge  Cranial Nerves:   II: Visual fields: Full to confrontation and nl VA. Pupils: equal, round, reactive to light  III,IV,VI: Extra Ocular Movements are intact. No nystagmus  V: Facial sensation is intact to pin prick and light touch  VII: Facial strength and movements: intact and symmetric  VIII: Hearing: Intact to finger rub bilaterally  IX: Palate elevation is symmetric  XI: Shoulder shrug is intact  XII: Tongue movements are normal  Musculoskeletal: 5/5 in the left side and right-sided weakness 3+/5 with poor effort. All 4 extremities. Tone: Normal tone. Reflexes: Bilateral biceps 2/4, triceps 2/4, brachial radialis 2/4, knee 2/4 and ankle 2/4.    Planters: flexor

## 2020-01-15 NOTE — CONSULTS
Electrophysiology Consultation   Date: 1/15/2020  Admit Date:  1/14/2020  Reason for Consultation: Atrial fibrillation and cerebral vascular disease  Consult Requesting Physician: Kasie Paulino MD     No chief complaint on file. HPI:   Mr. Riana Gay is a pleasant 80year old  with a medical history significant for Parkinson's disease, right sided subdural hematoma (CT scan from 03/2019), dementia, aortic valve stenosis, diabetes mellitus type II, recurrent falls, hypertension, hyperlipidemia, and coronary artery disease who presents from home with TIA. According to patient and his daughter he was in his usual state of health until yesterday. States that he was being evaluated by his provider at his nursing home and had stepped away to use rest room when he became limp and couldn't move. States that his mental status worsened and while his vital signs were stable, his provider was concerned for stroke. He was sent [facility] for further evaluation for stroke. ER/Hospital Course:  Patient presented with stroke like symptoms and was admitted with concern for stroke. His EKG was concerning for atrial fibrillation. Rates were controlled. Labs were reassuring. CT scan showed no acute infarct or hemorrhage and previous hematoma has resolved. Electrophysiology was consulted to assist with further management of his newly diagnosed atrial fibrillation.     Current rhythm: Sinus rhythm  Known history of atrial fibrillation: no  Valvular disease: Yes, atrial fibrillation  Associated symptoms: none  Heart rate is  controlled  Previous cardioversion and/or ablation: no  History of CAD: No  History of sleep apnea: unknown  History of ETOH abuse/drug abuse: No  History of thyroid disease: No  Left atrial size is pending echo    Past Medical History:   Diagnosis Date    GERD (gastroesophageal reflux disease)     Hyperlipidemia     Hypertension     Parkinson disease ([facility] [address]     Type II or %    · Telemetry: Sinus rhythm   · Constitutional: Alert. Oriented to person, place, and time. No distress. · Head: Normocephalic and atraumatic. · Mouth/Throat: Lips appear moist. Oropharynx is clear and moist.  · Eyes: Conjunctivae normal. EOM are normal.   · Neck: Neck supple. No lymphadenopathy. No rigidity. No JVD present. · Cardiovascular: Normal rate, regular rhythm. Normal S1&S2. Grade V/VI systolic murmur best LUSB. No diastolic component. · Pulmonary/Chest: Bilateral respiratory sounds present. No respiratory accessory muscle use. · Abdominal: Soft. Normal bowel sounds present. No distension, No tenderness. No splenomegaly. No hernia. · Musculoskeletal: No tenderness. Trace bilateral edema    · Neurological: Alert and oriented. Cranial nerve II-XII grossly intact  · Skin: Skin is warm and dry. No rash, lesions, ulcerations noted. · Psychiatric: No anxiety nor agitation. Labs:  Reviewed. Recent Labs     01/14/20  1525      K 4.4      CO2 25   BUN 22*   CREATININE 1.0     Recent Labs     01/14/20  1420   WBC 9.8   HGB 14.5   HCT 43.5   MCV 92.5        Lab Results   Component Value Date    TROPONINI <0.01 01/14/2020     No results found for: BNP  No results found for: PROTIME, INR  No results found for: CHOL, HDL, TRIG    Diagnostic and imaging results reviewed. ECG: Atrial fibrillation without signs of ongoing ischemia. Echo: Pending. Cath: None. I independently reviewed the ECG and telemetry.     Scheduled Meds:   sodium chloride flush  10 mL Intravenous 2 times per day    enoxaparin  40 mg Subcutaneous Daily    sertraline  100 mg Oral Daily    carbidopa-levodopa  1.5 tablet Oral 4x Daily    QUEtiapine  75 mg Oral Nightly    donepezil  10 mg Oral Nightly    pantoprazole  40 mg Oral QAM AC    Vitamin D  1,000 Units Oral Daily    insulin lispro  0-6 Units Subcutaneous TID WC    insulin lispro  0-3 Units Subcutaneous Nightly    amoxicillin-clavulanate  1 tablet Oral 2 times per day     Continuous Infusions:   dextrose       PRN Meds:.sodium chloride flush, magnesium hydroxide, ondansetron, acetaminophen, glucose, dextrose, glucagon (rDNA), dextrose     Assessment:   Patient Active Problem List    Diagnosis Date Noted    Stroke-like episode ([facility] [address] 01/15/2020    New onset a-fib ([address] 01/14/2020    Right arm pain 10/05/2016    Generalized edema 10/05/2016    Memory loss 06/05/2015    Aortic stenosis 07/29/2014    Parkinsons disease ([address] 06/21/2013    Type 2 diabetes mellitus without complication, without long-term current use of insulin ([address] 05/13/2013    Essential hypertension 05/13/2013    Pure hypercholesterolemia 05/13/2013    Spinal stenosis of lumbar region 05/13/2013      Active Hospital Problems    Diagnosis Date Noted    Stroke-like episode ([address] [I63.9] 01/15/2020    New onset a-fib ([address] [I48.91] 01/14/2020    Memory loss [R41.3] 06/05/2015    Parkinsons disease ([address] Alonsokoby Reeves 06/21/2013    Essential hypertension [I10] 05/13/2013    Type 2 diabetes mellitus without complication, without long-term current use of insulin ([address] [E11.9] 05/13/2013     Mr. Dalila Sprague is a pleasant 80year old  with a medical history significant for Parkinson's disease, right sided subdural hematoma (CT scan from 03/2019), dementia, aortic valve stenosis, diabetes mellitus type II, recurrent falls, hypertension, hyperlipidemia, and coronary artery disease who presents from home with TIA. Problem List:  1. Atrial fibrillation. 2. TIA/Cerebral vascular disease. 3. Dementia. 4. Aortic valve stenosis. Assessment and Plan:  1. Atrial fibrillation.   Mr. Dalila Sprague is a pleasant demented  who presents from home with TIA and newly diagnosed atrial fibrillation without RVR and a medical history significant for diabetes, hypertension, subdural hematoma, frequent falls (resolved, at current living facility family states that patient is very safe and hasn't had any further falls), and hyperlipidemia. Patient's KBCIT0TORe score is at least 6 with a stroke risk of 9.7%. Patients HASBLED score is 5. We discussed oral anticoagulation to decrease the risk of thromboembolic events including stroke. Benefits and alternatives were discussed with patient. Risk of bleeding was discussed. Patient verbalized understanding. Different forms of anticoagulants including warfarin (Coumadin), Dabigatran (Pradaxa), Rivaroxaban (Xarelto), Apixaban (Eliquis), and Edoxaban [facility]) were discussed. Patient's daughter opted for anticoagulation therapy given that he is in a safe living environment, at high stroke risk, and no longer has recurrent falls. We discussed [facility] patient family not currently interested in invasive measures. Rate control strategy options including cardioversion, atrial fibrillation ablation, pacemaker with AVN ablation, anti-arrhythmic strategy, and rate control strategy were discussed with patient. Risks, benefits and alternative of each treatment options were explained. All questions were answered. Patient's daughter Anayeli Buckley has opted for rate control. His rates have never been fast so will hold oral therapy for now. - Once evaluated by neurology and post MRI we will plan to start patient on rivaroxaban. - Hold on rate control agents as has never been tachycardic.   - We will continue to follow along with you. 2. TIA/Cerebral vascular disease.  - Per neurology and primary team.    3. Dementia. - Per neurology and primary team.    4. Aortic valve stenosis. Echocardiogram pending.  - Per primary team.    Thank you for allowing me to participate in the care of leela De La Torre . If you have any questions/comments, please do not hesitate to contact us.     Kirsten Jung MD  Cardiac Electrophysiology  [address] [facility]  [phone] [facility]

## 2020-01-15 NOTE — H&P
1,000 Units by mouth     Historical Provider, MD   Nutritional Supplements (BALANCED NUTRITIONAL SHAKE PLS PO) Take by mouth    Historical Provider, MD   Clobetasol Propionate 0.05 % LIQD Apply topically To scalp 2 times a day    Historical Provider, MD   QUEtiapine (SEROQUEL) 50 MG tablet Take 75 mg by mouth nightly Take 1.5 tabs by mouth at bedtime (75 mg)    Historical Provider, MD   carbidopa-levodopa (SINEMET)  MG per tablet Take 1.5 tablets by mouth 4 times daily 1.5 tabs 4 times a day    Historical Provider, MD   donepezil (ARICEPT) 10 MG tablet Take 10 mg by mouth nightly     Historical Provider, MD   metFORMIN (GLUCOPHAGE) 850 MG tablet Take 850 mg by mouth daily     Historical Provider, MD   omeprazole (PRILOSEC) 20 MG capsule Take 20 mg by mouth daily. Historical Provider, MD       Allergies:  Patient has no known allergies. Social History:      The patient currently lives at nursing home     TOBACCO:   reports that he quit smoking about 54 years ago. He has never used smokeless tobacco.  ETOH:   reports no history of alcohol use. E-Cigarettes Vaping or Juuling     Questions Responses    E-Cigarette Use     Start Date     Does device contain nicotine? Quit Date     E-Cigarette Type             Family History:      Reviewed in detail and negative for CAD, Cancer, CVA. Positive as follows:        Problem Relation Age of Onset    Kidney Disease Mother     Diabetes Father     Diabetes Sister        REVIEW OF SYSTEMS:   Pertinent positives as noted in the HPI. All other systems reviewed and negative. PHYSICAL EXAM PERFORMED:    BP (!) 146/74   Pulse 71   Temp 97.3 °F (36.3 °C) (Oral)   Resp 18   Ht 5' 8\" (1.727 m)   Wt 169 lb 3.2 oz (76.7 kg)   SpO2 100%   BMI 25.73 kg/m²     General appearance:  No apparent distress, appears stated age and cooperative. HEENT:  Normal cephalic, atraumatic without obvious deformity. Pupils equal, round, and reactive to light.   Extra ocular muscles

## 2020-01-16 VITALS
RESPIRATION RATE: 16 BRPM | HEART RATE: 52 BPM | DIASTOLIC BLOOD PRESSURE: 73 MMHG | WEIGHT: 169.2 LBS | HEIGHT: 68 IN | SYSTOLIC BLOOD PRESSURE: 154 MMHG | BODY MASS INDEX: 25.64 KG/M2 | OXYGEN SATURATION: 99 % | TEMPERATURE: 92.4 F

## 2020-01-16 LAB
ANION GAP SERPL CALCULATED.3IONS-SCNC: 12 MMOL/L (ref 3–16)
BUN BLDV-MCNC: 18 MG/DL (ref 7–20)
CALCIUM SERPL-MCNC: 9.3 MG/DL (ref 8.3–10.6)
CHLORIDE BLD-SCNC: 103 MMOL/L (ref 99–110)
CHOLESTEROL, TOTAL: 214 MG/DL (ref 0–199)
CO2: 25 MMOL/L (ref 21–32)
CREAT SERPL-MCNC: 0.9 MG/DL (ref 0.8–1.3)
GFR AFRICAN AMERICAN: >60
GFR NON-AFRICAN AMERICAN: >60
GLUCOSE BLD-MCNC: 110 MG/DL (ref 70–99)
GLUCOSE BLD-MCNC: 137 MG/DL (ref 70–99)
GLUCOSE BLD-MCNC: 202 MG/DL (ref 70–99)
HCT VFR BLD CALC: 40 % (ref 40.5–52.5)
HDLC SERPL-MCNC: 40 MG/DL (ref 40–60)
HEMOGLOBIN: 13.8 G/DL (ref 13.5–17.5)
LDL CHOLESTEROL CALCULATED: 136 MG/DL
MAGNESIUM: 1.7 MG/DL (ref 1.8–2.4)
MCH RBC QN AUTO: 31.3 PG (ref 26–34)
MCHC RBC AUTO-ENTMCNC: 34.5 G/DL (ref 31–36)
MCV RBC AUTO: 90.8 FL (ref 80–100)
ORGANISM: ABNORMAL
PDW BLD-RTO: 14.2 % (ref 12.4–15.4)
PERFORMED ON: ABNORMAL
PERFORMED ON: ABNORMAL
PLATELET # BLD: 149 K/UL (ref 135–450)
PMV BLD AUTO: 8.9 FL (ref 5–10.5)
POTASSIUM REFLEX MAGNESIUM: 4.2 MMOL/L (ref 3.5–5.1)
RBC # BLD: 4.4 M/UL (ref 4.2–5.9)
SODIUM BLD-SCNC: 140 MMOL/L (ref 136–145)
TRIGL SERPL-MCNC: 188 MG/DL (ref 0–150)
URINE CULTURE, ROUTINE: ABNORMAL
VLDLC SERPL CALC-MCNC: 38 MG/DL
WBC # BLD: 5.1 K/UL (ref 4–11)

## 2020-01-16 PROCEDURE — 80061 LIPID PANEL: CPT

## 2020-01-16 PROCEDURE — 97162 PT EVAL MOD COMPLEX 30 MIN: CPT

## 2020-01-16 PROCEDURE — 97535 SELF CARE MNGMENT TRAINING: CPT

## 2020-01-16 PROCEDURE — 97116 GAIT TRAINING THERAPY: CPT

## 2020-01-16 PROCEDURE — 36415 COLL VENOUS BLD VENIPUNCTURE: CPT

## 2020-01-16 PROCEDURE — 97530 THERAPEUTIC ACTIVITIES: CPT

## 2020-01-16 PROCEDURE — G0378 HOSPITAL OBSERVATION PER HR: HCPCS

## 2020-01-16 PROCEDURE — 97167 OT EVAL HIGH COMPLEX 60 MIN: CPT

## 2020-01-16 PROCEDURE — 2580000003 HC RX 258: Performed by: INTERNAL MEDICINE

## 2020-01-16 PROCEDURE — 6370000000 HC RX 637 (ALT 250 FOR IP): Performed by: REGISTERED NURSE

## 2020-01-16 PROCEDURE — 85027 COMPLETE CBC AUTOMATED: CPT

## 2020-01-16 PROCEDURE — 83735 ASSAY OF MAGNESIUM: CPT

## 2020-01-16 PROCEDURE — 80048 BASIC METABOLIC PNL TOTAL CA: CPT

## 2020-01-16 PROCEDURE — 6370000000 HC RX 637 (ALT 250 FOR IP): Performed by: NURSE PRACTITIONER

## 2020-01-16 PROCEDURE — 6370000000 HC RX 637 (ALT 250 FOR IP): Performed by: INTERNAL MEDICINE

## 2020-01-16 PROCEDURE — 99226 PR SBSQ OBSERVATION CARE/DAY 35 MINUTES: CPT | Performed by: PSYCHIATRY & NEUROLOGY

## 2020-01-16 PROCEDURE — 99215 OFFICE O/P EST HI 40 MIN: CPT | Performed by: INTERNAL MEDICINE

## 2020-01-16 PROCEDURE — 6360000002 HC RX W HCPCS: Performed by: INTERNAL MEDICINE

## 2020-01-16 PROCEDURE — 96372 THER/PROPH/DIAG INJ SC/IM: CPT

## 2020-01-16 RX ORDER — AMOXICILLIN AND CLAVULANATE POTASSIUM 875; 125 MG/1; MG/1
1 TABLET, FILM COATED ORAL EVERY 12 HOURS SCHEDULED
Qty: 20 TABLET | Refills: 0 | Status: SHIPPED | OUTPATIENT
Start: 2020-01-16 | End: 2020-01-26

## 2020-01-16 RX ORDER — ASPIRIN 81 MG/1
81 TABLET ORAL DAILY
Qty: 90 TABLET | Refills: 1 | Status: SHIPPED | OUTPATIENT
Start: 2020-01-16

## 2020-01-16 RX ORDER — ATORVASTATIN CALCIUM 20 MG/1
20 TABLET, FILM COATED ORAL DAILY
Qty: 90 TABLET | Refills: 1 | Status: SHIPPED | OUTPATIENT
Start: 2020-01-16

## 2020-01-16 RX ADMIN — CARBIDOPA AND LEVODOPA 1.5 TABLET: 25; 100 TABLET ORAL at 08:20

## 2020-01-16 RX ADMIN — SERTRALINE HYDROCHLORIDE 100 MG: 50 TABLET ORAL at 08:20

## 2020-01-16 RX ADMIN — ENOXAPARIN SODIUM 40 MG: 40 INJECTION SUBCUTANEOUS at 08:22

## 2020-01-16 RX ADMIN — Medication 10 ML: at 08:22

## 2020-01-16 RX ADMIN — INSULIN LISPRO 2 UNITS: 100 INJECTION, SOLUTION INTRAVENOUS; SUBCUTANEOUS at 13:32

## 2020-01-16 RX ADMIN — AMOXICILLIN AND CLAVULANATE POTASSIUM 1 TABLET: 875; 125 TABLET, FILM COATED ORAL at 08:20

## 2020-01-16 RX ADMIN — VITAMIN D, TAB 1000IU (100/BT) 1000 UNITS: 25 TAB at 08:22

## 2020-01-16 RX ADMIN — PANTOPRAZOLE SODIUM 40 MG: 40 TABLET, DELAYED RELEASE ORAL at 06:50

## 2020-01-16 RX ADMIN — CARBIDOPA AND LEVODOPA 1.5 TABLET: 25; 100 TABLET ORAL at 13:04

## 2020-01-16 ASSESSMENT — PAIN SCALES - GENERAL
PAINLEVEL_OUTOF10: 0

## 2020-01-16 NOTE — PROGRESS NOTES
[id number]  Neurology Follow-up  [facility] Neurology    Date of Service: 1/16/2020    Subjective:   CC: Follow up today regarding: Acute encephalopathy and possible new stroke. Events noted. Chart and lab reviewed. The patient is about the same. He is currently by himself. MRI of the brain showed no acute stroke. He denies any headache, double vision or blurred vision. He is poor historian. According to cardiology, he has no prior history of GI or frequent falling since he moved to a [facility]. Patient denies any other new symptoms. Other review of system was unremarkable. ROS : A 10-12 system review obtained and updated today and is unremarkable except as mentioned  in my interval history. family history includes Diabetes in his father and sister; Kidney Disease in his mother.     Past Medical History:   Diagnosis Date    GERD (gastroesophageal reflux disease)     Hyperlipidemia     Hypertension     Parkinson disease ([facility] [address]     Type II or unspecified type diabetes mellitus without mention of complication, not stated as uncontrolled     Ulcer      Current Facility-Administered Medications   Medication Dose Route Frequency Provider Last Rate Last Dose    magnesium oxide (MAG-OX) tablet 400 mg  400 mg Oral Once ENRIQUE Garcia - CNP        sodium chloride flush 0.9 % injection 10 mL  10 mL Intravenous 2 times per day Bladimir Molina MD   10 mL at 01/16/20 0822    sodium chloride flush 0.9 % injection 10 mL  10 mL Intravenous PRN Bladimir Molina MD        magnesium hydroxide (MILK OF MAGNESIA) 400 MG/5ML suspension 30 mL  30 mL Oral Daily PRN Bladimir Molina MD        ondansetron (ZOFRAN) injection 4 mg  4 mg Intravenous Q6H PRN Bladimir Molina MD        enoxaparin (LOVENOX) injection 40 mg  40 mg Subcutaneous Daily Bladimir Molina MD   40 mg at 01/16/20 6481    acetaminophen (TYLENOL) tablet 650 mg  650 mg Oral Q4H PRN Bladimir Molina MD   650 mg at 01/15/20 7112    sertraline (ZOLOFT) tablet 100 mg  100 mg Oral Daily Bladimir Molina MD   100 mg at 01/16/20 0820    carbidopa-levodopa (SINEMET)  MG per tablet 1.5 tablet  1.5 tablet Oral 4x Daily Bladimir Molina MD   1.5 tablet at 01/16/20 1304    QUEtiapine (SEROQUEL) tablet 75 mg  75 mg Oral Nightly Bladimir Molina MD   75 mg at 01/15/20 2149    donepezil (ARICEPT) tablet 10 mg  10 mg Oral Nightly Bladimir Molina MD   10 mg at 01/15/20 2149    pantoprazole (PROTONIX) tablet 40 mg  40 mg Oral QAM AC Bladimir Molina MD   40 mg at 01/16/20 0650    Vitamin D (CHOLECALCIFEROL) tablet 1,000 Units  1,000 Units Oral Daily Bladimir Molina MD   1,000 Units at 01/16/20 6151    insulin lispro (HUMALOG) injection vial 0-6 Units  0-6 Units Subcutaneous TID WC ENRIQUE Munoz CNP   2 Units at 01/16/20 1332    insulin lispro (HUMALOG) injection vial 0-3 Units  0-3 Units Subcutaneous Nightly ENRIQUE Munoz CNP   2 Units at 01/15/20 2149    glucose (GLUTOSE) 40 % oral gel 15 g  15 g Oral PRN ENRIQUE Munoz CNP        dextrose 50 % IV solution  12.5 g Intravenous PRN ENRIQUE Munoz CNP        glucagon (rDNA) injection 1 mg  1 mg Intramuscular PRN ENRIQUE Munoz CNP        dextrose 5 % solution  100 mL/hr Intravenous PRN ENRIQUE Munoz CNP        amoxicillin-clavulanate (AUGMENTIN) 875-125 MG per tablet 1 tablet  1 tablet Oral 2 times per day ENRQIUE Garcia CNP   1 tablet at 01/16/20 0820     No Known Allergies   reports that he quit smoking about 54 years ago. He has never used smokeless tobacco. He reports that he does not drink alcohol or use drugs.        Objective:  Exam:   Constitutional:   Vitals:    01/16/20 0019 01/16/20 0450 01/16/20 0757 01/16/20 1248   BP: 132/76 (!) 175/90 (!) 155/92 (!) 154/73   Pulse: 66 65 56 52   Resp: 18 18 16 16   Temp: 96.2 °F (35.7 °C) 96.3 °F (35.7 °C) 97.6 °F (36.4 °C) 92.4 °F (33.6 °C)   TempSrc: Oral Oral Oral Axillary   SpO2: 96% 96% 97% 99% Weight:       Height:         General appearance:  Normal development and appear in no acute distress. Eye: No icterus. Neck: supple  Cardiovascular:  No lower leg edema with good pulsation. Mental Status:   AAO x2  1/3 immediate recall  Fluent language but with paraphasic errors  Poor immediate recall and remote memory  Poor fund of knowledge  Cranial Nerves:   II: Visual fields: Full to confrontation and nl [facility]. Pupils: equal, round, reactive to light  III,IV,VI: Extra Ocular Movements are intact. No nystagmus  V: Facial sensation is intact to pin prick and light touch  VII: Facial strength and movements: intact and symmetric  VIII: Hearing: Intact to finger rub bilaterally  IX: Palate elevation is symmetric  XI: Shoulder shrug is intact  XII: Tongue movements are normal  Musculoskeletal: 5/5 in the left side and right-sided weakness 4+/5 with poor effort. All 4 extremities. Tone: Normal tone. Reflexes: Symmetric in both arms and legs. Planters: flexor bilaterally. Coordination: no dysmetria with FNF in upper extremities and normal REM on the left side. .   Sensation: normal to all modalities in both arms and legs.   Gait/Posture:  Steady        Data:  LABS:   Lab Results   Component Value Date     01/16/2020    K 4.2 01/16/2020     01/16/2020    CO2 25 01/16/2020    BUN 18 01/16/2020    CREATININE 0.9 01/16/2020    GFRAA >60 01/16/2020    LABGLOM >60 01/16/2020    LABGLOM 54 07/29/2014    GLUCOSE 137 01/16/2020    PHOS 4.5 07/29/2014    MG 1.70 01/16/2020    CALCIUM 9.3 01/16/2020     Lab Results   Component Value Date    WBC 5.1 01/16/2020    RBC 4.40 01/16/2020    HGB 13.8 01/16/2020    HCT 40.0 01/16/2020    MCV 90.8 01/16/2020    RDW 14.2 01/16/2020     01/16/2020   No results found for: INR, PROTIME    Neuroimaging was independently reviewed by me and discussed results with the patient   I reviewed blood testing and other test results and discussed results with the patient a      Impression:  Acute aphasia with right-sided weakness and concern for TIA. Could be cardioembolic from new onset A. fib  Hypertension, not controlled  Diabetes, not controlled. A1c 8.0. Hyperlipidemia  Advanced dementia  History of Parkinson disease      Recommendation  Stroke prevention was discussed with the patient in details  Discussed the case with cardiology over the phone  Agree with anticoagulation after addressing risk and benefit since he is at high risk of further strokes with his A. fib and remote strokes. Continue current blood pressure medication  Insulin sliding scale  Blood sugar monitor  Aspiration precaution  Continue Sinemet the same dose  Continue Aricept  Can be discharged when medically stable             Cindi Bartholomew MD   [phone]      This dictation was generated by voice recognition computer software. Although all attempts are made to edit the dictation for accuracy, there may be errors in the transcription that are not intended.

## 2020-01-16 NOTE — PROGRESS NOTES
Occupational Therapy   Occupational Therapy Initial Assessment/Treatment  Date: 2020   Patient Name: Romulo Huston  MRN: [id number]     : 1933    Date of Service: 2020    Discharge Recommendations:  ECF with OT  OT Equipment Recommendations  Equipment Needed: No    Assessment   Performance deficits / Impairments: Decreased functional mobility ; Decreased cognition;Decreased endurance;Decreased strength;Decreased balance;Decreased posture;Decreased coordination    Assessment: Pt tolerated OT evaluation well. Pt limited by fatigue and confusion. Pt requiring 2 person assist for transfers. Pt requiring Max A To 2 person total A for ADLs. At baseline pt has assist with ADLs and functional transfers with use of w/c for primary mobility. Pt appears to be slightly below basline level of occupational performance. Recommend continued OT services to increase safety and independence with ADLs and functional transfers. Anticipate pt will be safe to return to LTC with resumption of care and therapy services. Prognosis: Fair  Decision Making: High Complexity    OT Education: OT Role;Plan of Care;ADL Adaptive Strategies;Transfer Training;Orientation  Barriers to Learning: pt demonstrates and verbalizes understanding, however would benefit from continued education and training. REQUIRES OT FOLLOW UP: Yes    Activity Tolerance  Activity Tolerance: Patient Tolerated treatment well;Patient limited by fatigue;Treatment limited secondary to decreased cognition  Safety Devices  Safety Devices in place: Yes  Type of devices: Call light within reach; Left in chair;Chair alarm in place;Nurse notified           Patient Diagnosis(es): There were no encounter diagnoses. has a past medical history of GERD (gastroesophageal reflux disease), Hyperlipidemia, Hypertension, Parkinson disease ([facility] [address] Type II or unspecified type diabetes mellitus without mention of complication, not stated as uncontrolled, and Ulcer. has a past surgical history that includes Cataract removal with implant and Cataract removal with implant (Left, 04/28/2016). Restrictions  Restrictions/Precautions  Restrictions/Precautions: Up as Tolerated, General Precautions  Position Activity Restriction  Other position/activity restrictions: Parkinson's disease, history of falls    Subjective   General  Chart Reviewed: Yes  Patient assessed for rehabilitation services?: Yes  Additional Pertinent Hx: Parkinson's disease, dementia, DM, SDH, aortic valve stenosis, history of falls. Family / Caregiver Present: No    Diagnosis: Pt admitted with acute encephalopathy and R sided weakness; concern for TIA or CVA. Pt found to have new onset of Afib. MRI negative for CVA. Subjective  Subjective: Pt supine in bed upon therapy arrival. Pt reports feeling tired this morning. General Comment  Comments: RN agreeable to therapy evaluation. Patient Currently in Pain: Denies  Pre Treatment Pain Screening  Intervention List: Patient able to continue with treatment  Vital Signs  Patient Currently in Pain: Denies     Social/Functional History  Social/Functional History  Type of Home: Facility  Home Access: Ramped entrance, Level entry  Bathroom Shower/Tub: Walk-in shower  Bathroom Toilet: Handicap height  Bathroom Equipment: Grab bars around toilet, Grab bars in shower, Shower chair  Bathroom Accessibility: Accessible  Home Equipment: [phone]: Needs assistance  Homemaking Assistance: Needs assistance  Ambulation Assistance: Needs assistance  Transfer Assistance: Needs assistance  Additional Comments: Pt is a poor historian, pt states he needs assistance for all ADLs and OOB mobility. Pt states he primarily uses a w/c for mobility. Pt states he stays in his room for meals and does not go to a dining [place]. Spoke to Daughter, Migel Blount, with permission of pt. Daughter reports that pt has assist of at least 2 people for all transfers.  Pt initiation BUE     Bed mobility  Supine to Sit: Maximum assistance  Transfers  Stand Step Transfers: Dependent/Total(Max Ax2 people)  Sit to stand: Dependent/Total(Max Ax2 people)  Stand to sit: Dependent/Total(Mod Ax2 people)     Vision - Basic Assessment  Prior Vision: Wears glasses all the time  Patient Visual Report: No visual complaint reported. Oculo Motor Control: WNL    Cognition  Overall Cognitive Status: Exceptions  Arousal/Alertness: Delayed responses to stimuli  Following Commands: Follows one step commands with increased time  Attention Span: Attends with cues to redirect  Memory: Decreased recall of recent events  Problem Solving: Assistance required to identify errors made;Assistance required to generate solutions  Initiation: Requires cues for some  Sequencing: Requires cues for some        Sensation  Overall Sensation Status: Impaired  Light Touch: Partial deficits in the RLE        LUE PROM (degrees)  LUE PROM: WNL  LUE AROM (degrees)  LUE AROM : WFL  LUE General AROM: decreased motor initiation but able to achieve [facility] ROM provided extra time  RUE PROM (degrees)  RUE PROM: WNL  RUE AROM (degrees)  RUE AROM : Exceptions  RUE General AROM: decreased motor initiation but able to achieve [facility] ROM provided extra time  R Shoulder Flexion 0-180: ~0-70 degrees.       LUE Strength  Gross LUE Strength: Exceptions to [facility]  L Shoulder Flex: 4-/5  L Elbow Flex: 4/5  L Elbow Ext: 4/5  L Hand General: 4/5  RUE Strength  Gross RUE Strength: Exceptions to [facility]  R Shoulder Flex: 3-/5  R Elbow Flex: 3+/5  R Elbow Ext: 3+/5  R Hand General: 4-/5                   Plan   Plan  Times per week: 2-4x/week  Current Treatment Recommendations: Strengthening, Positioning, ROM, Safety Education & Training, Balance Training, Patient/Caregiver Education & Training, Self-Care / ADL, Cognitive Reorientation, Functional Mobility Training, Endurance Training    AM-PAC Score        AM-PAC Inpatient Daily Activity Raw Score: 6 (01/16/20 1151)  AM-PAC Inpatient ADL T-Scale Score : 17.07 (01/16/20 1151)  ADL Inpatient CMS 0-100% Score: 100 (01/16/20 1151)  ADL Inpatient CMS G-Code Modifier : CN (01/16/20 1151)    Goals  Short term goals  Time Frame for Short term goals: 1 week (1/23/20)  Short term goal 1: Mod A x2 people functional transfers to/from EOB, chair, BSC/toilet, w/c  Short term goal 2: SBA sitting balance EOB in preparation for functional transfers and ADLs  Short term goal 3: Increase standing activity tolerance to 1-2 minutes for engagement in ADLs. Patient Goals   Patient goals : return home       Therapy Time   Individual Concurrent Group Co-treatment   Time In 0826         Time Out 0916         Minutes 50         Timed Code Treatment Minutes: 35 Minutes(15 minute evaluation)     If pt is discharged prior to next OT session, this note will serve as the discharge summary.     Caro Kelly OT

## 2020-01-16 NOTE — PROGRESS NOTES
Electrophysiology Progress Note     Admit Date: 2020     Reason for follow up: Atrial fibrillation and cerebral vascular disease. Interval History:   - Patient seen and examined. - Clinical notes reviewed. - Telemetry reviewed. Atrial fibrillation without RVR.  - No new complaints today. - No major events overnight. Physical Examination:  Vitals:    20 1248   BP: (!) 154/73   Pulse: 52   Resp: 16   Temp: 92.4 °F (33.6 °C)   SpO2: 99%        Intake/Output Summary (Last 24 hours) at 2020 1644  Last data filed at 2020 1336  Gross per 24 hour   Intake 300 ml   Output 350 ml   Net -50 ml     In: 200 [P.O.:200]  Out: -    Wt Readings from Last 3 Encounters:   20 169 lb 3.2 oz (76.7 kg)   20 150 lb (68 kg)   19 160 lb (72.6 kg)     Temp  Av °F (35.6 °C)  Min: 92.4 °F (33.6 °C)  Max: 97.6 °F (36.4 °C)  Pulse  Av.6  Min: 52  Max: 69  BP  Min: 132/76  Max: 179/75  SpO2  Av.2 %  Min: 95 %  Max: 100 %    · Constitutional: Alert. Oriented to person, place, and time. No distress. Sitting in chair watching TV. · Cardiovascular: Irregular rate and rhythm with grade V/VI systolic murmur. · Pulmonary/Chest: Bilateral respiratory sounds present. No respiratory accessory muscle use. No wheezes, No rhonchi. · Abdominal: Soft. Normal bowel sounds present. No distension, No tenderness. No splenomegaly. No hernia. · Musculoskeletal: No tenderness. No edema    · Lymphadenopathy: Has no cervical adenopathy. · Neurological: Alert and oriented. Cranial nerve II-XII grossly intact. · Psychiatric: No anxiety nor agitation. Labs, diagnostic and imaging results reviewed. Reviewed.    Recent Labs     20  1525 20  0929    140   K 4.4 4.2    103   CO2 25 25   BUN 22* 18   CREATININE 1.0 0.9     Recent Labs     20  1420 20  0929   WBC 9.8 5.1   HGB 14.5 13.8   HCT 43.5 40.0*   MCV 92.5 90.8    149     Lab Results   Component Value Date    TROPONINI <0.01 01/14/2020     Estimated Creatinine Clearance: 57 mL/min (based on SCr of 0.9 mg/dL).    No results found for: BNP  No results found for: PROTIME, INR  No results found for: CHOL, HDL, TRIG    Scheduled Meds:   magnesium oxide  400 mg Oral Once    sodium chloride flush  10 mL Intravenous 2 times per day    enoxaparin  40 mg Subcutaneous Daily    sertraline  100 mg Oral Daily    carbidopa-levodopa  1.5 tablet Oral 4x Daily    QUEtiapine  75 mg Oral Nightly    donepezil  10 mg Oral Nightly    pantoprazole  40 mg Oral QAM AC    Vitamin D  1,000 Units Oral Daily    insulin lispro  0-6 Units Subcutaneous TID WC    insulin lispro  0-3 Units Subcutaneous Nightly    amoxicillin-clavulanate  1 tablet Oral 2 times per day     Continuous Infusions:   dextrose       PRN Meds:sodium chloride flush, magnesium hydroxide, ondansetron, acetaminophen, glucose, dextrose, glucagon (rDNA), dextrose     Patient Active Problem List    Diagnosis Date Noted    Arterial ischemic stroke, ICA, left, acute ([facility] 01/15/2020    HTN (hypertension), benign     Dyslipidemia     PAF (paroxysmal atrial fibrillation) ([facility] 01/14/2020    Right arm pain 10/05/2016    Generalized edema 10/05/2016    Memory loss 06/05/2015    Aortic stenosis 07/29/2014    Parkinsons disease ([facility] 06/21/2013    DM (diabetes mellitus), type 2, uncontrolled with complications ([facility] 70/55/4279    Essential hypertension 05/13/2013    Pure hypercholesterolemia 05/13/2013    Spinal stenosis of lumbar region 05/13/2013      Active Hospital Problems    Diagnosis Date Noted    Arterial ischemic stroke, ICA, left, acute ([facility] [I63.232] 01/15/2020    HTN (hypertension), benign [I10]     Dyslipidemia [E78.5]     PAF (paroxysmal atrial fibrillation) ([facility] [I48.0] 01/14/2020    Memory loss [R41.3] 06/05/2015    Parkinsons disease ([facility] Ron Mckinney 06/21/2013    Essential hypertension [I10] 05/13/2013    DM (diabetes mellitus), type

## 2020-01-16 NOTE — PROGRESS NOTES
Pt d/c'd home to [place]. Removed L-AC and ELY IV and stopped bleeding. Both Catheters intact. Pt tolerated well. No redness noted at site. Notified [facility] and removed tele box. Reviewed d/c instructions, home meds, and  f/u information utilizing teach-back method. Scripts for Augmentin were called to [facility] on [address] [place]. Patient verbalized understanding. Called report to Viviana at [address], explained that Augmentin Rx was at [facility] and she said the inhouse MD will fill Rx for them. Did discuss the blood thinner with Viviana and explained that family wanted blood thinners but nothing had been ordered prior to patient leaving.

## 2020-01-16 NOTE — DISCHARGE INSTR - COC
Status/Restrictions: No weight bearing restirctions  Other Medical Equipment (for information only, NOT a DME order):  wheelchair  Other Treatments:     Patient's personal belongings (please select all that are sent with patient):  all belongings given to patient. RN SIGNATURE:  Electronically signed by Katy Gross RN on 1/16/20 at 4:15 PM    CASE MANAGEMENT/SOCIAL WORK SECTION    Inpatient Status Date: ***    Readmission Risk Assessment Score:  Readmission Risk              Risk of Unplanned Readmission:        13           Discharging to Facility/ Agency   · Name: [facility]  · Phone:[phone]  · Fax:[phone]        / signature: Electronically signed by FALLON Smith on 1/16/20 at 2:42 PM    PHYSICIAN SECTION    Prognosis: Fair    Condition at Discharge: Stable    Rehab Potential (if transferring to Rehab): Fair    Recommended Labs or Other Treatments After Discharge: PT/OT    Physician Certification: I certify the above information and transfer of Yousuf Medeiros  is necessary for the continuing treatment of the diagnosis listed and that he requires Long-term care for greater 30 days.      Update Admission H&P: No change in H&P    PHYSICIAN SIGNATURE:  Electronically signed by ENRIQUE Galindo CNP on 1/16/20 at 2:04 PM

## 2020-01-16 NOTE — DISCHARGE SUMMARY
Hospital Medicine Discharge Summary    Patient ID: Benjamin Kim      Patient's PCP: Merlinda Snipes, MD    Admit Date: 1/14/2020     Discharge Date: 1/16/2020      Admitting Physician: Anayeli Rodriguez MD     Discharge Physician: ENRIQUE Stringer - CNP     Discharge Diagnoses: Active Hospital Problems    Diagnosis    Arterial ischemic stroke, ICA, left, acute ([facility] [I63.232]    HTN (hypertension), benign [I10]    Dyslipidemia [E78.5]    PAF (paroxysmal atrial fibrillation) (HCC) [I48.0]    Memory loss [R41.3]    Parkinsons disease ([facility] [G20]    Essential hypertension [I10]    DM (diabetes mellitus), type 2, uncontrolled with complications [facility] [J50.7, E11.65]       The patient was seen and examined on day of discharge and this discharge summary is in conjunction with any daily progress note from day of discharge. Hospital Course:   Pt is an 79 yo male with PMH of Parkinson's disease, dementia, subdural hematoma in March 2019 and DM2 who presented to [facility] due to hypothermia noted at nursing home. Much of history is obtained via EMR as pt is a very poor historian. Per EMR, pt noted to have facial droop but his daughter stated that there is no changed in his face and this is baseline for him. Pt was transferred to [facility] for further evaluation and management. Hypothermia (resolved):  - Unclear etiology. Possibly due to underlying infection/UTI. TSH WNL. Right sided weakness with reported facial droop:  - CT head negative. CTA head and neck showed no large vessel occlusion. MRI brain negative for stroke. - Start aspirin and statin. . Pt started on Xarelto. - ECHO showed normal LVEF at 55-60%, no RWMAs, normal LVDFP, mild MS, mild MR and AR, mod-severe AS, SPAP normal.  - PT/OT/SLP evaluations with recs for therapy at [facility], no dysphagia noted.     New onset atrial fibrillation with rate control:  - EP consulted who recommended initiation of bilaterally       Labs: For convenience and continuity at follow-up the following most recent labs are provided:      CBC:    Lab Results   Component Value Date    WBC 5.1 01/16/2020    HGB 13.8 01/16/2020    HCT 40.0 01/16/2020     01/16/2020       Renal:    Lab Results   Component Value Date     01/16/2020    K 4.2 01/16/2020     01/16/2020    CO2 25 01/16/2020    BUN 18 01/16/2020    CREATININE 0.9 01/16/2020    CALCIUM 9.3 01/16/2020    PHOS 4.5 07/29/2014         Significant Diagnostic Studies    Radiology:   MRI BRAIN WO CONTRAST   Final Result   Mild-to-moderate chronic small ischemic disease and age related involutional   change. No acute stroke, midline shift or mass effect. Consults:     IP CONSULT TO CARDIOLOGY  IP CONSULT TO NEUROLOGY    Disposition:   [address]    Condition at Discharge: Stable    Discharge Instructions/Follow-up:  Follow-up with PCP     Code Status:  DNR-CC     Activity: activity as tolerated    Diet: Diabetic diet       Discharge Medications:     Aspirin 81 mg PO daily     Atorvastatin 20 mg PO nightly    Xarelto 20 mg PO nightly       Discharge Medication List as of 1/16/2020  4:15 PM           Details   amoxicillin-clavulanate (AUGMENTIN) 875-125 MG per tablet Take 1 tablet by mouth every 12 hours for 10 days, Disp-20 tablet, R-0Normal              Details   sertraline (ZOLOFT) 100 MG tablet Take 100 mg by mouth dailyHistorical Med      Multiple Vitamin (MULTI-VITAMIN DAILY PO) Take by mouth daily Historical Med      Cholecalciferol (VITAMIN D3) 25 MCG (1000 UT) CAPS Take 1,000 Units by mouth daily Historical Med      Nutritional Supplements (BALANCED NUTRITIONAL SHAKE PLS PO) Take by mouthHistorical Med      Clobetasol Propionate 0.05 % LIQD Apply topically To scalp 2 times a dayHistorical Med      QUEtiapine (SEROQUEL) 50 MG tablet Take 75 mg by mouth nightly Historical Med      carbidopa-levodopa (SINEMET)  MG per tablet Take 1.5 tablets by mouth 4 times daily Historical Med      donepezil (ARICEPT) 10 MG tablet Take 10 mg by mouth nightly Historical Med      metFORMIN (GLUCOPHAGE) 850 MG tablet Take 850 mg by mouth daily Historical Med      omeprazole (PRILOSEC) 20 MG capsule Take 20 mg by mouth daily. Time Spent on discharge is more than 45 minutes in the examination, evaluation, counseling and review of medications and discharge plan. Signed:    ENRIQUE Reddy - CNP   1/16/2020      Thank you Britany Mathews MD for the opportunity to be involved in this patient's care. If you have any questions or concerns please feel free to contact me at [phone].

## 2020-01-16 NOTE — PROGRESS NOTES
Physical Therapy    Facility/Department: [facility] A2 CARD TELEMETRY  Initial Assessment    NAME: Yenni Bartlett  : 1933  MRN: [id number]    Date of Service: 2020    Discharge Recommendations:  ECF with PT   PT Equipment Recommendations  Equipment Needed: No  Other: Defer to next level of care. Assessment   Body structures, Functions, Activity limitations: Decreased functional mobility ; Decreased strength;Decreased endurance;Decreased coordination;Decreased sensation;Decreased safe awareness;Decreased ROM; Decreased cognition;Decreased balance;Decreased posture  Assessment: Pt is a 81 y/o male presenting to [facility] with right sided weakness and right LE numbness. Pt's PLOF is unclear d/t pt's cognitive status. Pt is markedly weaker on the right side and requires Mod A x2 for ambulation and sit<>stands. Pt requires constant cues for safety awareness, positioning, and hand placement. Pt will benefit from physical therapy to address deficits above. PT recommends return to [facility] with PT. Continue to co-tx with OT d/t pt's current level of assist.  Treatment Diagnosis: Decreased functional mobility  Prognosis: Good  Decision Making: Medium Complexity  PT Education: Goals;PT Role;Plan of Care; Functional Mobility Training  Patient Education: Pt verbalizes understanding, but will require reinforcement. Barriers to Learning: cognition  REQUIRES PT FOLLOW UP: Yes  Activity Tolerance  Activity Tolerance: Patient Tolerated treatment well;Patient limited by cognitive status; Patient limited by fatigue       Patient Diagnosis(es): There were no encounter diagnoses. has a past medical history of GERD (gastroesophageal reflux disease), Hyperlipidemia, Hypertension, Parkinson disease ([facility] Type II or unspecified type diabetes mellitus without mention of complication, not stated as uncontrolled, and Ulcer.    has a past surgical history that includes Cataract removal with implant and Cataract removal with implant (Left, 04/28/2016). Restrictions  Restrictions/Precautions  Restrictions/Precautions: Up as Tolerated, General Precautions  Position Activity Restriction  Other position/activity restrictions: Parkinson's disease, history of falls  Vision/Hearing  Vision: Impaired  Vision Exceptions: Wears glasses at all times  Hearing: Exceptions to [facility]  Hearing Exceptions: Bilateral hearing aid     Subjective  General  Chart Reviewed: Yes  Patient assessed for rehabilitation services?: Yes  Additional Pertinent Hx: HTN, DM, PD, Dementia  Response To Previous Treatment: Not applicable  Family / Caregiver Present: No  Referring Practitioner: ENRIQUE Reddy CNP  Referral Date : 01/15/20  Subjective  Subjective: Pt agreeable to therapy. Would like to go to the bathroom. Pain Screening  Patient Currently in Pain: Denies  Vital Signs  Patient Currently in Pain: Denies       Orientation  Orientation  Overall Orientation Status: Impaired(Difficult to assess, pt just woke up and groggy.)  Social/Functional History  Social/Functional History  Type of Home: Facility  Home Access: Ramped entrance, Level entry  Bathroom Shower/Tub: Walk-in shower  Bathroom Toilet: Handicap height  Bathroom Equipment: Grab bars around toilet, Grab bars in shower, Shower chair  Bathroom Accessibility: Accessible  Home Equipment: [address]  ADL Assistance: Needs assistance  Homemaking Assistance: Needs assistance  Ambulation Assistance: Needs assistance  Transfer Assistance: Needs assistance  Additional Comments: Pt is a poor historian, pt states he needs assistance for all ADLs and OOB mobility. Pt states he primarily uses a w/c for mobility. Pt states he stays in his room for meals and does not go to a dining [place]. Spoke to Daughter, Sylvain Mcclain, with permission of pt. Daughter reports that pt has assist of at least 2 people for all transfers. Pt uses w/c for mobility while pt propels with BLE.  Pt has assist of 1-2 people for all ADLs including feeding/grooming. Pt received therapy services until November 2019 but were discontinued due to lack of progress. Daughter reports that pt does not exhibit cognitive deficits at baseline. Cognition   Cognition  Overall Cognitive Status: Exceptions  Arousal/Alertness: Delayed responses to stimuli  Following Commands: Follows one step commands with increased time  Attention Span: Attends with cues to redirect  Memory: Decreased recall of recent events  Problem Solving: Assistance required to identify errors made;Assistance required to generate solutions  Initiation: Requires cues for some  Sequencing: Requires cues for some    Objective          PROM RLE (degrees)  RLE PROM: Exceptions  RLE General PROM: ~20 degrees lacking knee extension, non painful passive testing  AROM RLE (degrees)  RLE AROM: Exceptions  RLE General AROM: ~20 degrees lacking knee extension  PROM LLE (degrees)  LLE PROM: Exceptions  LLE General PROM: ~20 degrees lacking knee extension, non painful passive testing  AROM LLE (degrees)  LLE AROM : Exceptions  LLE General AROM: ~20 degrees lacking knee extension  Strength RLE  Strength RLE: Exception  R Hip Flexion: 2/5  R Hip ABduction: 3-/5  R Hip ADduction: 3-/5  R Knee Extension: 3-/5  R Ankle Dorsiflexion: 2+/5  R Ankle Plantar flexion: 3-/5  Strength LLE  Strength LLE: Exception  L Hip Flexion: 3+/5  L Hip ABduction: 3/5  L Hip ADduction: 3/5  L Knee Extension: 3/5  L Ankle Dorsiflexion: 3/5  L Ankle Plantar Flexion: 3+/5     Sensation  Overall Sensation Status: Impaired  Light Touch: Partial deficits in the RLE  Bed mobility  Supine to Sit: Maximum assistance  Transfers  Sit to Stand:  Moderate Assistance;2 Person Assistance(with RW.)  Stand to sit: Moderate Assistance;2 Person Assistance(w/RW.)  Bed to Chair: Moderate assistance;2 Person Assistance(for RW management and cues for posture.)  Ambulation  Ambulation?: Yes  More Ambulation?: No  Ambulation 1  Surface: level tile  Device: Rolling [facility]: Moderate assistance;2 Person assistance  Quality of Gait: Pt demos partial step through pattern, decreased step length, decreased step height, and frequent stopping and starting ambulation. Pt requires assist with RW management. Gait Deviations: Slow Bhumika;Decreased step length;Decreased step height;Shuffles  Distance: 10 feet + 5 feet  Stairs/Curb  Stairs?: No     Balance  Posture: Fair  Sitting - Static: Fair;+  Sitting - Dynamic: Fair;+  Standing - Static: Poor;+  Standing - Dynamic: Poor  Comments: w/RW. Plan   Plan  Times per week: 3-5x  Times per day: Daily  Plan weeks: 1 week, until 1/23/20  Current Treatment Recommendations: Strengthening, ROM, Balance Training, Functional Mobility Training, Transfer Training, Endurance Training, Stair training, Gait Training, Neuromuscular Re-education, Wheelchair Mobility Training, Cognitive Reorientation, Safety Education & Training, Patient/Caregiver Education & Training, Home Exercise Program, Equipment Evaluation, Education, & procurement  Safety Devices  Type of devices: All fall risk precautions in place, Call light within reach, Chair alarm in place, Gait belt, Left in chair  Restraints  Initially in place: No    AM-PAC Score  AM-PAC Inpatient Mobility Raw Score : 11 (01/16/20 1310)  AM-PAC Inpatient T-Scale Score : 33.86 (01/16/20 1310)  Mobility Inpatient CMS 0-100% Score: 72.57 (01/16/20 1310)  Mobility Inpatient CMS G-Code Modifier : CL (01/16/20 1310)        Goals  Short term goals  Time Frame for Short term goals: 5-7 days (unless otherwise stated) by 1/23/20  Short term goal 1: Pt will perform bed mobility with Min A x1. Short term goal 2: Pt will perform sit<>stand with Mod A x1. Short term goal 3: Pt will ambulate with Mod A x1 with the LRAD. Short term goal 4: Pt will perform 12-15 reps of BLE ther ex to increase strength and ROM by 1/19/20. Patient Goals   Patient goals : To go home.      Therapy Time   Individual Concurrent Group Co-treatment   Time In 0823         Time Out 0902         Minutes 39         Timed Code Treatment Minutes: [address] [facility], [address] [facility]

## 2020-01-17 ENCOUNTER — TELEPHONE (OUTPATIENT)
Dept: CARDIOLOGY CLINIC | Age: 85
End: 2020-01-17

## 2020-01-17 NOTE — TELEPHONE ENCOUNTER
Penny at [facility] would like a call back about this pt's change in meds at [facility].

## 2020-01-18 LAB
BLOOD CULTURE, ROUTINE: NORMAL
CULTURE, BLOOD 2: NORMAL